# Patient Record
Sex: MALE | Race: OTHER | Employment: FULL TIME | URBAN - METROPOLITAN AREA
[De-identification: names, ages, dates, MRNs, and addresses within clinical notes are randomized per-mention and may not be internally consistent; named-entity substitution may affect disease eponyms.]

---

## 2020-09-01 RX ORDER — OMEPRAZOLE 20 MG/1
40 CAPSULE, DELAYED RELEASE ORAL DAILY
COMMUNITY

## 2020-09-01 SDOH — HEALTH STABILITY: MENTAL HEALTH: HOW OFTEN DO YOU HAVE A DRINK CONTAINING ALCOHOL?: NEVER

## 2020-09-02 ENCOUNTER — OFFICE VISIT (OUTPATIENT)
Dept: ENDOCRINOLOGY | Age: 40
End: 2020-09-02
Payer: COMMERCIAL

## 2020-09-02 VITALS
SYSTOLIC BLOOD PRESSURE: 103 MMHG | HEART RATE: 64 BPM | HEIGHT: 66 IN | BODY MASS INDEX: 38.31 KG/M2 | DIASTOLIC BLOOD PRESSURE: 75 MMHG | OXYGEN SATURATION: 97 % | TEMPERATURE: 97.5 F | WEIGHT: 238.4 LBS

## 2020-09-02 PROBLEM — E29.1 HYPOGONADISM IN MALE: Status: ACTIVE | Noted: 2020-09-02

## 2020-09-02 PROBLEM — E23.6 RATHKE'S CLEFT CYST (HCC): Status: ACTIVE | Noted: 2020-09-02

## 2020-09-02 PROBLEM — E22.1 HYPERPROLACTINEMIA (HCC): Status: ACTIVE | Noted: 2020-09-02

## 2020-09-02 PROBLEM — L40.9 PSORIASIS: Status: ACTIVE | Noted: 2020-09-02

## 2020-09-02 PROCEDURE — 99244 OFF/OP CNSLTJ NEW/EST MOD 40: CPT | Performed by: INTERNAL MEDICINE

## 2020-09-02 NOTE — PATIENT INSTRUCTIONS
Patient Education        Learning About a Prolactinoma  What is a prolactinoma? A prolactinoma is a tumor on the pituitary gland that makes too much of the hormone prolactin. This type of tumor is benign, which means it's not cancer. The pituitary (say \"oze-RBO-sg-tair-ee\") gland--at the base of the brain--makes prolactin. After a woman is pregnant, this hormone causes the breasts to make milk. But a prolactinoma also makes prolactin. This means that your body can have too much of the hormone. It's not normal for women who aren't pregnant or nursing to have a high level of prolactin. For both men and women, too much of this hormone can make the breasts produce milk. It also can cause low sex drive and infertility. What are the symptoms? You might not have any symptoms from a prolactinoma. But in some men and women, their breasts may produce milk. In women, an increase in prolactin can lower the level of estrogen. That can cause infertility, menstrual changes, and less desire to have sex. In men, it can lower the level of testosterone. That can cause erection problems and less desire to have sex. The tumor may cause a headache. And sometimes the tumor presses on the optic nerves, which are near the pituitary gland. This might cause vision problems. How is it diagnosed? A blood test will show if you have too much prolactin in your blood. Your doctor also may do an MRI test. It can show if you have the tumor and how big it is. How is it treated? Sometimes, no treatment is needed. If you have symptoms, your doctor may treat you with dopamine agonists. This medicine can shrink the tumor. It also may bring the level of prolactin back to normal. You may need to take this medicine for 2 years or more. During and after treatment, you will get routine tests to check your hormone levels. In some cases, surgery is done to remove the tumor. This may happen if you can't take the medicine or it doesn't work. Surgery also could be done if the tumor grows or causes problems like headaches or vision problems. Follow-up care is a key part of your treatment and safety. Be sure to make and go to all appointments, and call your doctor if you are having problems. It's also a good idea to know your test results and keep a list of the medicines you take. Where can you learn more? Go to https://[email or web address].[email or web address].Huaat. org and sign in to your [employer] account. Enter P110 in the [employer] box to learn more about \"Learning About a Prolactinoma. \"     If you do not have an account, please click on the \"Sign Up Now\" link. Current as of: August 22, 2019               Content Version: 12.5  © 1922-3558 Healthwise, Incorporated. Care instructions adapted under license by [facility] ([facility]). If you have questions about a medical condition or this instruction, always ask your healthcare professional. [address] any warranty or liability for your use of this information.

## 2020-09-02 NOTE — PROGRESS NOTES
Patient ID:   Vy Lagos is a 36 y.o. male      Chief Complaint:   Vy Lagos is seen for initial evaluation of elevated prolactin levels. Referred by Dr. Gene Haddad MD - Neurosurgery , [place]   Subjective:     Weight loss clinic suggested Testosterone check which was low.  to MRI brain in July 2020 showed 7 x 9x 4 mm in th epoast pituitary gland.  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   Right cerebellar lesion of 1.9 x 1.7 cms    Energy levels bad   Libido is good   Performance is good     He has children   Plans to have more kids     Denies nipple discharge, Nipple piercing     Not on antipsychotics, SSRIs, estrogen supplements, antiemetics, Ca channel blockers, methyldopa, opioids  Denies drugs (heroine)     No family h/o endocrine tumors     The following portions of the patient's history were reviewed and updated as appropriate:        Family History   Problem Relation Age of Onset    Brain Cancer Mother     Cancer Mother     Seizures Mother          Social History     Socioeconomic History    Marital status:      Spouse name: Not on file    Number of children: Not on file    Years of education: Not on file    Highest education level: Not on file   Occupational History    Not on file   Social Needs    Financial resource strain: Not on file    Food insecurity     Worry: Not on file     Inability: Not on file   [employer] needs     Medical: Not on file     Non-medical: Not on file   Tobacco Use    Smoking status: Never Smoker    Smokeless tobacco: Never Used   Substance and Sexual Activity    Alcohol use: Never     Frequency: Never    Drug use: Never    Sexual activity: Yes     Partners: Female   Lifestyle    Physical activity     Days per week: Not on file     Minutes per session: Not on file    Stress: Not on file   Relationships    Social connections     Talks on phone: Not on file     Gets together: Not on file     Attends Mu-ism service: Not on file Active member of club or organization: Not on file     Attends meetings of clubs or organizations: Not on file     Relationship status: Not on file    Intimate partner violence     Fear of current or ex partner: Not on file     Emotionally abused: Not on file     Physically abused: Not on file     Forced sexual activity: Not on file   Other Topics Concern    Not on file   Social History Narrative    Not on file         Past Medical History:   Diagnosis Date    Cerebellar lesion     GERD (gastroesophageal reflux disease)     [facility] [address] [address]     Pituitary lesion ([facility] [address]          Past Surgical History:   Procedure Laterality Date    ABDOMEN SURGERY         No Known Allergies      Current Outpatient Medications:     omeprazole (PRILOSEC) 20 MG delayed release capsule, Take 40 mg by mouth daily , Disp: , Rfl:       Review of Systems:  Constitutional: Negative for fever, chills, and unexpected weight change. HENT: Negative for congestion, ear pain, rhinorrhea,  sore throat and trouble swallowing. Eyes: Negative for photophobia, redness, itching. Respiratory: Negative for cough, shortness of breath and sputum. Cardiovascular: Negative for chest pain, palpitations and leg swelling. Gastrointestinal: Negative for nausea, vomiting, abdominal pain, diarrhea, constipation. Endocrine: Negative for cold intolerance, heat intolerance, polydipsia, polyphagia and polyuria. Genitourinary: Negative for dysuria, urgency, frequency, hematuria and flank pain. Musculoskeletal: Negative for myalgias, back pain, arthralgias and neck pain. Skin/Nail: Negative for rash, itching. Normal nails. Neurological: Negative for seizures, weakness, light-headedness, numbness and headaches. Hematological/ Lymph nodes: Negative for adenopathy. Does not bruise/bleed easily. Psychiatric/Behavioral: Negative for suicidal ideas, depression, anxiety, sleep disturbance and decreased concentration.           Objective: Metabolic Panel; Future  -     Prolactin; Future  -     Follicle Stimulating Hormone; Future  -     Luteinizing Hormone; Future  -     Insulin-Like Growth Factor; Future  -     Testosterone,  w SHBG Adult Male; Future  -     Miscellaneous Sendout 1; Future  -     TSH without Reflex; Future  -     T4, Free; Future  -     T3, Free; Future    Rathke's cleft cyst ([facility] [address]  -     Comprehensive Metabolic Panel; Future  -     Prolactin; Future  -     Follicle Stimulating Hormone; Future  -     Luteinizing Hormone; Future  -     Insulin-Like Growth Factor; Future  -     Testosterone,  w SHBG Adult Male; Future  -     Miscellaneous Sendout 1; Future  -     TSH without Reflex; Future  -     T4, Free; Future  -     T3, Free; Future    Psoriasis  -     Zeenat Hartley MD, Rheumatology, [facility]        1 Pituitary lesion with hypogonadism and hyperprolactinemia   Microadenoma vs Rathke's cleft cyst   Reviewed blood work from [employer] on his cell phone , July 2020   TSH 0.54, FSH 2.1, prolactin 63.7 (H), Testosterone 49 (L)     T levels low likely due to hyperprolactinemia   Will recheck all levels   Prolactin and thyroid fix will improve T levels     Check for CCP, TSH, FT4, FT3, Prolactin, Macroprolactin, TT, FSH, LH     MRI in 3 months by Neurosurgeon     3: Morbid obesity   Follows with dietitian   Stays away from carbs/sugars     Discussed weight loss options of exercise (150 minutes per week) plus diet plans   Diet plans may include intermittent fasting, low carb diet, weight watchers, mediterranean diet or caloric restriction.    Work on comorbid conditions and improved sleep     3: Psoarisis of fingers/knees     RTC in 2 months     Electronically signed by Montse Ivy MD on 9/2/2020 at 11:45 AM

## 2020-09-03 LAB
ALBUMIN/GLOBULIN RATIO: 1.9 RATIO (ref 0.8–2.6)
ALBUMIN: 4.6 G/DL (ref 3.5–5.2)
ALP BLD-CCNC: 66 U/L (ref 23–144)
ALT SERPL-CCNC: 33 U/L (ref 0–60)
AST SERPL-CCNC: 22 U/L (ref 0–55)
BILIRUB SERPL-MCNC: 0.6 MG/DL (ref 0–1.2)
BUN BLDV-MCNC: 17 MG/DL (ref 3–29)
BUN/CREAT BLD: 21 (ref 7–25)
CALCIUM SERPL-MCNC: 9.5 MG/DL (ref 8.5–10.5)
CHLORIDE BLD-SCNC: 101 MEQ/L (ref 96–110)
CO2: 28 MEQ/L (ref 19–32)
CREAT SERPL-MCNC: 0.8 MG/DL (ref 0.5–1.4)
GFR AFRICAN AMERICAN: 130 MLS/MIN/1.73M2
GFR NON-AFRICAN AMERICAN: 112 MLS/MIN/1.73M2
GLOBULIN: 2.4 G/DL (ref 1.9–3.6)
GLUCOSE BLD-MCNC: 97 MG/DL (ref 70–99)
POTASSIUM SERPL-SCNC: 4.7 MEQ/L (ref 3.4–5.3)
SODIUM BLD-SCNC: 136 MEQ/L (ref 135–148)
STATUS: NORMAL
T4 FREE: 1.22 NG/DL (ref 0.8–1.8)
TOTAL PROTEIN: 7 G/DL (ref 6–8.3)
TSH SERPL DL<=0.05 MIU/L-ACNC: 0.63 MCIU/ML (ref 0.4–4.5)

## 2020-09-04 LAB
FOLLICLE STIMULATING HORMONE: 1 MCIU/ML (ref 1.4–18.1)
LUTEINIZING HORMONE: 0.8 MIU/ML (ref 1.5–9.3)
PROLACTIN: 102 NG/ML (ref 2–18)
T3 FREE: 3.1 PG/ML (ref 2.3–4.2)

## 2020-09-06 LAB — SEX HORMONE BINDING GLOBULIN: 28 NMOL/L (ref 10–50)

## 2020-09-14 ENCOUNTER — TELEPHONE (OUTPATIENT)
Dept: ENDOCRINOLOGY | Age: 40
End: 2020-09-14

## 2020-09-14 NOTE — TELEPHONE ENCOUNTER
PT asked if we received his lab results.  He completed his lab work at [employer] on 9/3/20

## 2020-09-18 RX ORDER — CABERGOLINE 0.5 MG/1
0.25 TABLET ORAL
Qty: 8 TABLET | Refills: 1 | Status: SHIPPED | OUTPATIENT
Start: 2020-09-21 | End: 2020-10-16 | Stop reason: SDUPTHER

## 2020-10-15 ENCOUNTER — TELEPHONE (OUTPATIENT)
Dept: ENDOCRINOLOGY | Age: 40
End: 2020-10-15

## 2020-10-15 RX ORDER — CABERGOLINE 0.5 MG/1
0.25 TABLET ORAL
Qty: 8 TABLET | Refills: 1 | OUTPATIENT
Start: 2020-10-15 | End: 2021-10-15

## 2020-10-15 NOTE — TELEPHONE ENCOUNTER
Ivory Marcus Miracle advised to  refill. Mr. Marcus Rausch then stated he has been taking one whole tablet twice weekly which is why he ran out. Explained per Dr. Iggy Verdugo to just start taking the half tablet twice weekly and will refill at [facility].

## 2020-10-16 RX ORDER — CABERGOLINE 0.5 MG/1
0.25 TABLET ORAL
Qty: 8 TABLET | Refills: 0 | Status: SHIPPED | OUTPATIENT
Start: 2020-10-19 | End: 2020-11-04 | Stop reason: SDUPTHER

## 2020-10-16 NOTE — TELEPHONE ENCOUNTER
PT called back states he need a PA for the cabergoline 1 tab or the Provider can write him a script for 2 Tablets and send to pharmacy

## 2020-10-28 PROBLEM — E78.5 HYPERLIPIDEMIA: Status: ACTIVE | Noted: 2018-10-15

## 2020-11-03 ENCOUNTER — OFFICE VISIT (OUTPATIENT)
Dept: ENDOCRINOLOGY | Age: 40
End: 2020-11-03
Payer: COMMERCIAL

## 2020-11-03 VITALS
OXYGEN SATURATION: 96 % | BODY MASS INDEX: 37.83 KG/M2 | HEART RATE: 80 BPM | WEIGHT: 235.4 LBS | DIASTOLIC BLOOD PRESSURE: 78 MMHG | TEMPERATURE: 97.1 F | HEIGHT: 66 IN | SYSTOLIC BLOOD PRESSURE: 119 MMHG

## 2020-11-03 DIAGNOSIS — E22.1 HYPERPROLACTINEMIA (HCC): ICD-10-CM

## 2020-11-03 DIAGNOSIS — E23.6 RATHKE'S CLEFT CYST (HCC): ICD-10-CM

## 2020-11-03 DIAGNOSIS — E23.7 PITUITARY LESION (HCC): ICD-10-CM

## 2020-11-03 DIAGNOSIS — E29.1 HYPOGONADISM IN MALE: ICD-10-CM

## 2020-11-03 LAB
A/G RATIO: 2.1 (ref 1.1–2.2)
ALBUMIN SERPL-MCNC: 4.8 G/DL (ref 3.4–5)
ALP BLD-CCNC: 75 U/L (ref 40–129)
ALT SERPL-CCNC: 37 U/L (ref 10–40)
ANION GAP SERPL CALCULATED.3IONS-SCNC: 14 MMOL/L (ref 3–16)
AST SERPL-CCNC: 28 U/L (ref 15–37)
BILIRUB SERPL-MCNC: 0.7 MG/DL (ref 0–1)
BUN BLDV-MCNC: 18 MG/DL (ref 7–20)
CALCIUM SERPL-MCNC: 9.3 MG/DL (ref 8.3–10.6)
CHLORIDE BLD-SCNC: 102 MMOL/L (ref 99–110)
CO2: 24 MMOL/L (ref 21–32)
CREAT SERPL-MCNC: 0.7 MG/DL (ref 0.9–1.3)
GFR AFRICAN AMERICAN: >60
GFR NON-AFRICAN AMERICAN: >60
GLOBULIN: 2.3 G/DL
GLUCOSE BLD-MCNC: 85 MG/DL (ref 70–99)
POTASSIUM SERPL-SCNC: 4 MMOL/L (ref 3.5–5.1)
PROLACTIN: 2.9 NG/ML
SODIUM BLD-SCNC: 140 MMOL/L (ref 136–145)
TOTAL PROTEIN: 7.1 G/DL (ref 6.4–8.2)

## 2020-11-03 PROCEDURE — G8417 CALC BMI ABV UP PARAM F/U: HCPCS | Performed by: INTERNAL MEDICINE

## 2020-11-03 PROCEDURE — G8484 FLU IMMUNIZE NO ADMIN: HCPCS | Performed by: INTERNAL MEDICINE

## 2020-11-03 PROCEDURE — 99214 OFFICE O/P EST MOD 30 MIN: CPT | Performed by: INTERNAL MEDICINE

## 2020-11-03 PROCEDURE — G8427 DOCREV CUR MEDS BY ELIG CLIN: HCPCS | Performed by: INTERNAL MEDICINE

## 2020-11-03 PROCEDURE — 1036F TOBACCO NON-USER: CPT | Performed by: INTERNAL MEDICINE

## 2020-11-03 NOTE — PROGRESS NOTES
Patient ID:   Antonio Langford is a 36 y.o. male      Chief Complaint:   Antonio Langford is seen for an evaluation of elevated prolactin levels. Referred by Dr. Monika Clement MD - Neurosurgery , [place]   Subjective:     Weight loss clinic suggested Testosterone check which was low.  to MRI brain in July 2020 showed 7 x 9x 4 mm in th epoast pituitary gland.  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   Right cerebellar lesion of 1.9 x 1.7 cms    Cabergoline 0.25 mg twice a week     Energy levels are better since on cabergoline   Appetite is low   Libido is better    Performance is better   He has morning erections     He has children   Plans to have more kids     Denies nipple discharge, nipple piercing     Not on antipsychotics, SSRIs, estrogen supplements, antiemetics, Ca channel blockers, methyldopa, opioids  Denies drugs (heroine)     No family h/o endocrine tumors     The following portions of the patient's history were reviewed and updated as appropriate:        Family History   Problem Relation Age of Onset    Brain Cancer Mother     Cancer Mother     Seizures Mother          Social History     Socioeconomic History    Marital status:      Spouse name: Not on file    Number of children: Not on file    Years of education: Not on file    Highest education level: Not on file   Occupational History    Not on file   Social Needs    Financial resource strain: Not on file    Food insecurity     Worry: Not on file     Inability: Not on file   [employer] needs     Medical: Not on file     Non-medical: Not on file   Tobacco Use    Smoking status: Never Smoker    Smokeless tobacco: Never Used   Substance and Sexual Activity    Alcohol use: Never     Frequency: Never    Drug use: Never    Sexual activity: Yes     Partners: Female   Lifestyle    Physical activity     Days per week: Not on file     Minutes per session: Not on file    Stress: Not on file   Relationships    Social connections Talks on phone: Not on file     Gets together: Not on file     Attends Church service: Not on file     Active member of club or organization: Not on file     Attends meetings of clubs or organizations: Not on file     Relationship status: Not on file    Intimate partner violence     Fear of current or ex partner: Not on file     Emotionally abused: Not on file     Physically abused: Not on file     Forced sexual activity: Not on file   Other Topics Concern    Not on file   Social History Narrative    Not on file         Past Medical History:   Diagnosis Date    Cerebellar lesion     GERD (gastroesophageal reflux disease)     Pit[facility] ([facility] [address]     Pituitary lesion ([facility] [address]          Past Surgical History:   Procedure Laterality Date    ABDOMEN SURGERY         No Known Allergies      Current Outpatient Medications:     cabergoline (DOSTINEX) 0.5 MG tablet, Take 0.5 tablets by mouth Twice a Week, Disp: 8 tablet, Rfl: 0    omeprazole (PRILOSEC) 20 MG delayed release capsule, Take 40 mg by mouth daily , Disp: , Rfl:       Review of Systems:  Constitutional: Negative for fever, chills, and unexpected weight change. HENT: Negative for congestion, ear pain, rhinorrhea,  sore throat and trouble swallowing. Eyes: Negative for photophobia, redness, itching. Respiratory: Negative for cough, shortness of breath and sputum. Cardiovascular: Negative for chest pain, palpitations and leg swelling. Gastrointestinal: Negative for nausea, vomiting, abdominal pain, diarrhea, constipation. Endocrine: Negative for cold intolerance, heat intolerance, polydipsia, polyphagia and polyuria. Genitourinary: Negative for dysuria, urgency, frequency, hematuria and flank pain. Musculoskeletal: Negative for myalgias, back pain, arthralgias and neck pain. Skin/Nail: Negative for rash, itching. Normal nails. Neurological: Negative for seizures, weakness, light-headedness, numbness and headaches.    Hematological/ Lymph nodes: Negative for adenopathy. Does not bruise/bleed easily. Psychiatric/Behavioral: Negative for suicidal ideas, depression, anxiety, sleep disturbance and decreased concentration. Objective:   Physical Exam:  /78 (Site: Right Upper Arm, Position: Sitting, Cuff Size: Large Adult)   Pulse 80   Temp 97.1 °F (36.2 °C) (Temporal)   Ht 5' 6\" (1.676 m)   Wt 235 lb 6.4 oz (106.8 kg)   SpO2 96%   BMI 37.99 kg/m²   Constitutional: Patient is oriented to person, place, and time. Patient appears well-developed and well-nourished. HENT:    Head: Normocephalic and atraumatic. Eyes: Conjunctivae and EOM are normal.    Neck: Normal range of motion. Thyroid normal.   Cardiovascular: Normal rate, regular rhythm and normal heart sounds. Pulmonary/Chest: Effort normal and breath sounds normal.   Abdominal: Soft. Bowel sounds are normal.   Musculoskeletal: Normal range of motion. Neurological: Patient is alert and oriented to person, place, and time. Patient has slow reflexes. Skin: Skin is warm and dry. Psoriasis of fingers and knees. Psychiatric: Patient has a normal mood and affect.  Patient behavior is normal.     Lab Review:    Office Visit on 09/02/2020   Component Date Value Ref Range Status    T4 Free 09/03/2020 1.22  0.80 - 1.80 NG/DL Final    Sodium 09/03/2020 136  135 - 148 MEQ/L Final    Potassium 09/03/2020 4.7  3.4 - 5.3 MEQ/L Final    Chloride 09/03/2020 101  96 - 110 MEQ/L Final    CO2 09/03/2020 28  19 - 32 MEQ/L Final    Glucose 09/03/2020 97  70 - 99 MG/DL Final    BUN 09/03/2020 17  3 - 29 MG/DL Final    CREATININE 09/03/2020 0.8  0.5 - 1.4 MG/DL Final    Bun/Cre Ratio 09/03/2020 21  7 - 25 Final    GFR Non- 09/03/2020 112  >60 MLS/MIN/1.73M2 Final    GFR African American 09/03/2020 130  >60 MLS/MIN/1.73M2 Final    Calcium 09/03/2020 9.5  8.5 - 10.5 MG/DL Final    Total Protein 09/03/2020 7.0  6.0 - 8.3 G/DL Final    Albumin 09/03/2020 4.6  3.5 - 5.2 G/DL Final    Globulin 09/03/2020 2.4  1.9 - 3.6 G/DL Final    Albumin/Globulin Ratio 09/03/2020 1.9  0.8 - 2.6 RATIO Final    Total Bilirubin 09/03/2020 0.6  0.0 - 1.2 MG/DL Final    AST 09/03/2020 22  0 - 55 U/L Final    ALT 09/03/2020 33  0 - 60 U/L Final    Alkaline Phosphatase 09/03/2020 66  23 - 144 U/L Final    Status 09/03/2020 FASTING   Final    TSH 09/03/2020 0.628  0.400 - 4.500 MCIU/ML Final           Assessment and Plan     Frankey Rud was seen today for follow-up. Diagnoses and all orders for this visit:    Hyperprolactinemia ([address]  -     Prolactin; Future  -     Comprehensive Metabolic Panel; Future    Pituitary lesion (HCC)  -     Prolactin; Future  -     Comprehensive Metabolic Panel; Future    Hypogonadism in male  -     Prolactin; Future  -     Comprehensive Metabolic Panel; Future    Rathke's cleft cyst ([facility] [address]  -     Prolactin; Future  -     Comprehensive Metabolic Panel; Future        1 Pituitary lesion with hypogonadism and hyperprolactinemia   Microadenoma vs Rathke's cleft cyst   Reviewed blood work from [employer] on his cell phone , July 2020   TSH 0.54, FSH 2.1, prolactin 63.7 (H), Testosterone 49 (L)     Secondary work was normal in Sep 2020 (including TFT's)     Prolactin was 102 before starting cabergoline in Sep 2020     Improved in sexual function , will check T levels after normalizing prolactin     Meanwhile continue Cabergoline 0.25 mg twice a week   I may adjust the dose after the labs     MRI in 3 months by Neurosurgeon - Dec 2020. To looks pituitary and cerebellar. 3: Morbid obesity   Follows with dietitian   Stays away from carbs/sugars     Discussed weight loss options of exercise (150 minutes per week) plus diet plans   Diet plans may include intermittent fasting, low carb diet, weight watchers, mediterranean diet or caloric restriction.    Work on comorbid conditions and improved sleep     3: Psoarisis of fingers/knees     Prolactin right now   Will check TT when prolactin has normalized     Will make next visit in am so he can fast and will do fasting T levels     RTC in 2 months     Electronically signed by Lizbeth Phillip MD on 11/3/2020 at 11:15 AM

## 2020-11-04 RX ORDER — CABERGOLINE 0.5 MG/1
0.25 TABLET ORAL
Qty: 8 TABLET | Refills: 3 | Status: SHIPPED | OUTPATIENT
Start: 2020-11-05 | End: 2021-02-22 | Stop reason: SDUPTHER

## 2021-02-03 ENCOUNTER — OFFICE VISIT (OUTPATIENT)
Dept: ENDOCRINOLOGY | Age: 41
End: 2021-02-03
Payer: COMMERCIAL

## 2021-02-03 VITALS
WEIGHT: 232.4 LBS | HEIGHT: 67 IN | SYSTOLIC BLOOD PRESSURE: 110 MMHG | OXYGEN SATURATION: 96 % | TEMPERATURE: 97.3 F | DIASTOLIC BLOOD PRESSURE: 65 MMHG | HEART RATE: 66 BPM | BODY MASS INDEX: 36.47 KG/M2

## 2021-02-03 DIAGNOSIS — E23.7 PITUITARY LESION (HCC): ICD-10-CM

## 2021-02-03 DIAGNOSIS — E22.1 HYPERPROLACTINEMIA (HCC): ICD-10-CM

## 2021-02-03 DIAGNOSIS — E29.1 HYPOGONADISM IN MALE: ICD-10-CM

## 2021-02-03 DIAGNOSIS — E22.1 HYPERPROLACTINEMIA (HCC): Primary | ICD-10-CM

## 2021-02-03 DIAGNOSIS — G93.9 CEREBELLAR LESION: ICD-10-CM

## 2021-02-03 PROBLEM — J02.9 ACUTE PHARYNGITIS: Status: ACTIVE | Noted: 2021-02-03

## 2021-02-03 LAB
A/G RATIO: 1.8 (ref 1.1–2.2)
ALBUMIN SERPL-MCNC: 4.5 G/DL (ref 3.4–5)
ALP BLD-CCNC: 78 U/L (ref 40–129)
ALT SERPL-CCNC: 27 U/L (ref 10–40)
ANION GAP SERPL CALCULATED.3IONS-SCNC: 13 MMOL/L (ref 3–16)
AST SERPL-CCNC: 18 U/L (ref 15–37)
BILIRUB SERPL-MCNC: 0.8 MG/DL (ref 0–1)
BUN BLDV-MCNC: 16 MG/DL (ref 7–20)
CALCIUM SERPL-MCNC: 9.3 MG/DL (ref 8.3–10.6)
CHLORIDE BLD-SCNC: 103 MMOL/L (ref 99–110)
CO2: 26 MMOL/L (ref 21–32)
CREAT SERPL-MCNC: 0.7 MG/DL (ref 0.9–1.3)
GFR AFRICAN AMERICAN: >60
GFR NON-AFRICAN AMERICAN: >60
GLOBULIN: 2.5 G/DL
GLUCOSE BLD-MCNC: 88 MG/DL (ref 70–99)
POTASSIUM SERPL-SCNC: 4.5 MMOL/L (ref 3.5–5.1)
PROLACTIN: 2 NG/ML
SODIUM BLD-SCNC: 142 MMOL/L (ref 136–145)
TOTAL PROTEIN: 7 G/DL (ref 6.4–8.2)

## 2021-02-03 PROCEDURE — G8427 DOCREV CUR MEDS BY ELIG CLIN: HCPCS | Performed by: INTERNAL MEDICINE

## 2021-02-03 PROCEDURE — G8417 CALC BMI ABV UP PARAM F/U: HCPCS | Performed by: INTERNAL MEDICINE

## 2021-02-03 PROCEDURE — 99214 OFFICE O/P EST MOD 30 MIN: CPT | Performed by: INTERNAL MEDICINE

## 2021-02-03 PROCEDURE — G8484 FLU IMMUNIZE NO ADMIN: HCPCS | Performed by: INTERNAL MEDICINE

## 2021-02-03 PROCEDURE — 1036F TOBACCO NON-USER: CPT | Performed by: INTERNAL MEDICINE

## 2021-02-03 NOTE — PROGRESS NOTES
Patient ID:   Sonya Rivera is a 36 y.o. male    Chief Complaint:   Sonya Rivera is seen for an evaluation of elevated prolactin levels. Referred by Dr. Jailene Gomez MD - Neurosurgery , [place]   Subjective:     Weight loss clinic suggested Testosterone check which was low.  to MRI brain in July 2020 showed 7 x 9x 4 mm in th epoast pituitary gland.  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   Right cerebellar lesion of 1.9 x 1.7 cms    Cabergoline 0.25 mg twice a week     Energy levels are better since on cabergoline   Appetite is low   Libido is very good    Performance is good    He has morning erections     He has children   Plans to have more kids     Denies nipple discharge     Not on antipsychotics, SSRIs, estrogen supplements, antiemetics, Ca channel blockers, methyldopa, opioids  Denies drugs (heroine)     No family h/o endocrine tumors     The following portions of the patient's history were reviewed and updated as appropriate:        Family History   Problem Relation Age of Onset    Brain Cancer Mother     Cancer Mother     Seizures Mother          Social History     Socioeconomic History    Marital status:      Spouse name: Not on file    Number of children: Not on file    Years of education: Not on file    Highest education level: Not on file   Occupational History    Not on file   Social Needs    Financial resource strain: Not on file    Food insecurity     Worry: Not on file     Inability: Not on file   [employer] needs     Medical: Not on file     Non-medical: Not on file   Tobacco Use    Smoking status: Never Smoker    Smokeless tobacco: Never Used   Substance and Sexual Activity    Alcohol use: Never     Frequency: Never    Drug use: Never    Sexual activity: Yes     Partners: Female   Lifestyle    Physical activity     Days per week: Not on file     Minutes per session: Not on file    Stress: Not on file   Relationships    Social connections     Talks on phone: Not on file     Gets together: Not on file     Attends Sikhism service: Not on file     Active member of club or organization: Not on file     Attends meetings of clubs or organizations: Not on file     Relationship status: Not on file    Intimate partner violence     Fear of current or ex partner: Not on file     Emotionally abused: Not on file     Physically abused: Not on file     Forced sexual activity: Not on file   Other Topics Concern    Not on file   Social History Narrative    Not on file         Past Medical History:   Diagnosis Date    Cerebellar lesion     GERD (gastroesophageal reflux disease)     Pit[facility] lesion ([facility] [address]     Pituitary lesion ([facility] [address]          Past Surgical History:   Procedure Laterality Date    ABDOMEN SURGERY         No Known Allergies      Current Outpatient Medications:     cabergoline (DOSTINEX) 0.5 MG tablet, Take 0.5 tablets by mouth Twice a Week, Disp: 8 tablet, Rfl: 3    omeprazole (PRILOSEC) 20 MG delayed release capsule, Take 40 mg by mouth daily , Disp: , Rfl:       Review of Systems:  Constitutional: Negative for fever, chills, and unexpected weight change. HENT: Negative for congestion, ear pain, rhinorrhea,  sore throat and trouble swallowing. Eyes: Negative for photophobia, redness, itching. Respiratory: Negative for cough, shortness of breath and sputum. Cardiovascular: Negative for chest pain, palpitations and leg swelling. Gastrointestinal: Negative for nausea, vomiting, abdominal pain, diarrhea, constipation. Endocrine: Negative for cold intolerance, heat intolerance, polydipsia, polyphagia and polyuria. Genitourinary: Negative for dysuria, urgency, frequency, hematuria and flank pain. Musculoskeletal: Negative for myalgias, back pain, arthralgias and neck pain. Skin/Nail: Negative for rash, itching. Normal nails. Neurological: Negative for seizures, weakness, light-headedness, numbness and headaches.    Hematological/ Lymph nodes: Negative for adenopathy. Does not bruise/bleed easily. Psychiatric/Behavioral: Negative for suicidal ideas, depression, anxiety, sleep disturbance and decreased concentration. Objective:   Physical Exam:  /65 (Site: Right Upper Arm, Position: Sitting, Cuff Size: Large Adult)   Pulse 66   Temp 97.3 °F (36.3 °C) (Temporal)   Ht 5' 7\" (1.702 m)   Wt 232 lb 6.4 oz (105.4 kg)   SpO2 96%   BMI 36.40 kg/m²   Constitutional: Patient is oriented to person, place, and time. Patient appears well-developed and well-nourished. HENT:    Head: Normocephalic and atraumatic. Eyes: Conjunctivae and EOM are normal.    Neck: Normal range of motion. Thyroid normal.   Cardiovascular: Normal rate, regular rhythm and normal heart sounds. Pulmonary/Chest: Effort normal and breath sounds normal.   Abdominal: Soft. Bowel sounds are normal.   Musculoskeletal: Normal range of motion. Neurological: Patient is alert and oriented to person, place, and time. Patient has slow reflexes. Skin: Skin is warm and dry. Psoriasis of fingers and knees. Psychiatric: Patient has a normal mood and affect.  Patient behavior is normal.     Lab Review:    Orders Only on 11/03/2020   Component Date Value Ref Range Status    Sodium 11/03/2020 140  136 - 145 mmol/L Final    Potassium 11/03/2020 4.0  3.5 - 5.1 mmol/L Final    Chloride 11/03/2020 102  99 - 110 mmol/L Final    CO2 11/03/2020 24  21 - 32 mmol/L Final    Anion Gap 11/03/2020 14  3 - 16 Final    Glucose 11/03/2020 85  70 - 99 mg/dL Final    BUN 11/03/2020 18  7 - 20 mg/dL Final    CREATININE 11/03/2020 0.7* 0.9 - 1.3 mg/dL Final    GFR Non- 11/03/2020 >60  >60 Final    GFR  11/03/2020 >60  >60 Final    Calcium 11/03/2020 9.3  8.3 - 10.6 mg/dL Final    Total Protein 11/03/2020 7.1  6.4 - 8.2 g/dL Final    Albumin 11/03/2020 4.8  3.4 - 5.0 g/dL Final    Albumin/Globulin Ratio 11/03/2020 2.1  1.1 - 2.2 Final    Total Bilirubin 11/03/2020 0.7  0.0 - 1.0 mg/dL Final    Alkaline Phosphatase 11/03/2020 75  40 - 129 U/L Final    ALT 11/03/2020 37  10 - 40 U/L Final    AST 11/03/2020 28  15 - 37 U/L Final    Globulin 11/03/2020 2.3  g/dL Final    Prolactin 11/03/2020 2.9  ng/mL Final   Office Visit on 09/02/2020   Component Date Value Ref Range Status    T4 Free 09/03/2020 1.22  0.80 - 1.80 NG/DL Final    Sodium 09/03/2020 136  135 - 148 MEQ/L Final    Potassium 09/03/2020 4.7  3.4 - 5.3 MEQ/L Final    Chloride 09/03/2020 101  96 - 110 MEQ/L Final    CO2 09/03/2020 28  19 - 32 MEQ/L Final    Glucose 09/03/2020 97  70 - 99 MG/DL Final    BUN 09/03/2020 17  3 - 29 MG/DL Final    CREATININE 09/03/2020 0.8  0.5 - 1.4 MG/DL Final    Bun/Cre Ratio 09/03/2020 21  7 - 25 Final    GFR Non- 09/03/2020 112  >60 MLS/MIN/1.73M2 Final    GFR African American 09/03/2020 130  >60 MLS/MIN/1.73M2 Final    Calcium 09/03/2020 9.5  8.5 - 10.5 MG/DL Final    Total Protein 09/03/2020 7.0  6.0 - 8.3 G/DL Final    Albumin 09/03/2020 4.6  3.5 - 5.2 G/DL Final    Globulin 09/03/2020 2.4  1.9 - 3.6 G/DL Final    Albumin/Globulin Ratio 09/03/2020 1.9  0.8 - 2.6 RATIO Final    Total Bilirubin 09/03/2020 0.6  0.0 - 1.2 MG/DL Final    AST 09/03/2020 22  0 - 55 U/L Final    ALT 09/03/2020 33  0 - 60 U/L Final    Alkaline Phosphatase 09/03/2020 66  23 - 144 U/L Final    Status 09/03/2020 FASTING   Final    TSH 09/03/2020 0.628  0.400 - 4.500 MCIU/ML Final    Sex Hormone Binding 09/03/2020 28  10 - 50 nmol/L Final    FSH 09/03/2020 1.0* 1.4 - 18.1 MCIU/ML Final    LH 09/03/2020 0.8* 1.5 - 9.3 MIU/ML Final    Prolactin 09/03/2020 102* 2 - 18 NG/ML Final    T3, Free 09/03/2020 3.1  2.3 - 4.2 PG/ML Final           Assessment and Plan     Abimael Solorzano was seen today for follow-up. Diagnoses and all orders for this visit:    Hyperprolactinemia ([facility] [address]  -     Comprehensive Metabolic Panel; Future  -     Prolactin;  Future  - Testosterone, free, total; Future    Pituitary lesion ([facility] [address]  -     Comprehensive Metabolic Panel; Future  -     Prolactin; Future  -     Testosterone, free, total; Future    Hypogonadism in male  -     Comprehensive Metabolic Panel; Future  -     Prolactin; Future  -     Testosterone, free, total; Future    Cerebellar lesion  -     Comprehensive Metabolic Panel; Future  -     Prolactin; Future  -     Testosterone, free, total; Future        1 Pituitary lesion with hypogonadism and hyperprolactinemia   Microadenoma vs Rathke's cleft cyst   Reviewed blood work from [facility] on his cell phone , July 2020   TSH 0.54, FSH 2.1, prolactin 63.7 (H), Testosterone 49 (L)     Secondary work was normal in Sep 2020 (including TFT's)     Prolactin was 102 before starting cabergoline in Sep 2020     On Cabergoline 0.25 mg twice a week- Prolactin improved to 2.9 in Nov 2020     Improved in sexual function , will check T levels after normalizing prolactin     Meanwhile continue Cabergoline 0.25 mg twice a week   I may adjust the dose after the labs     MRI Jan 2021: Report reviewed from [facility]. \"Pituitary: Again noted is the heterogeneous lesion in the inferior aspect of the pituitary gland. This is less well defined compared to the prior. There has been no interval increase in size. Superior pituitary tissue remains convex upward near the midline. There is no deviation of the pituitary stalk.  Optic chiasm is normal.  Stable ill-defined area of high T2 and FLAIR signal in the right cerebellar hemisphere with a small amount of associated low T1 signal and dark gradient echo signal.\"     He has to schedule appointment with Neurosurgeon. (for pituitary and cerebellar)     If no MRI done by Neurosurgery than I will repeat MRI in Jan 2022    3: Morbid obesity   Follows with dietitian   Stays away from carbs/sugars     Discussed weight loss options of exercise (150 minutes per week) plus diet plans   Diet plans may include

## 2021-02-05 LAB
SEX HORMONE BINDING GLOBULIN: 22 NMOL/L (ref 11–80)
TESTOSTERONE FREE-NONMALE: 108.3 PG/ML (ref 47–244)
TESTOSTERONE TOTAL: 427 NG/DL (ref 220–1000)

## 2021-02-22 DIAGNOSIS — E22.1 HYPERPROLACTINEMIA (HCC): ICD-10-CM

## 2021-02-22 RX ORDER — CABERGOLINE 0.5 MG/1
0.25 TABLET ORAL
Qty: 8 TABLET | Refills: 3 | Status: SHIPPED | OUTPATIENT
Start: 2021-02-22 | End: 2021-08-03 | Stop reason: SDUPTHER

## 2021-08-03 ENCOUNTER — OFFICE VISIT (OUTPATIENT)
Dept: ENDOCRINOLOGY | Age: 41
End: 2021-08-03
Payer: COMMERCIAL

## 2021-08-03 VITALS
HEART RATE: 72 BPM | BODY MASS INDEX: 35.47 KG/M2 | WEIGHT: 226 LBS | DIASTOLIC BLOOD PRESSURE: 70 MMHG | SYSTOLIC BLOOD PRESSURE: 118 MMHG | OXYGEN SATURATION: 98 % | HEIGHT: 67 IN

## 2021-08-03 DIAGNOSIS — E22.1 HYPERPROLACTINEMIA (HCC): ICD-10-CM

## 2021-08-03 DIAGNOSIS — E23.7 PITUITARY LESION (HCC): ICD-10-CM

## 2021-08-03 DIAGNOSIS — E22.1 HYPERPROLACTINEMIA (HCC): Primary | ICD-10-CM

## 2021-08-03 DIAGNOSIS — E29.1 HYPOGONADISM IN MALE: ICD-10-CM

## 2021-08-03 LAB
A/G RATIO: 2 (ref 1.1–2.2)
ALBUMIN SERPL-MCNC: 4.7 G/DL (ref 3.4–5)
ALP BLD-CCNC: 74 U/L (ref 40–129)
ALT SERPL-CCNC: 25 U/L (ref 10–40)
ANION GAP SERPL CALCULATED.3IONS-SCNC: 9 MMOL/L (ref 3–16)
AST SERPL-CCNC: 19 U/L (ref 15–37)
BILIRUB SERPL-MCNC: 0.8 MG/DL (ref 0–1)
BUN BLDV-MCNC: 20 MG/DL (ref 7–20)
CALCIUM SERPL-MCNC: 9.2 MG/DL (ref 8.3–10.6)
CHLORIDE BLD-SCNC: 103 MMOL/L (ref 99–110)
CO2: 27 MMOL/L (ref 21–32)
CREAT SERPL-MCNC: 0.8 MG/DL (ref 0.9–1.3)
GFR AFRICAN AMERICAN: >60
GFR NON-AFRICAN AMERICAN: >60
GLOBULIN: 2.3 G/DL
GLUCOSE BLD-MCNC: 85 MG/DL (ref 70–99)
POTASSIUM SERPL-SCNC: 4.5 MMOL/L (ref 3.5–5.1)
PROLACTIN: 1.5 NG/ML
SODIUM BLD-SCNC: 139 MMOL/L (ref 136–145)
TOTAL PROTEIN: 7 G/DL (ref 6.4–8.2)

## 2021-08-03 PROCEDURE — G8427 DOCREV CUR MEDS BY ELIG CLIN: HCPCS | Performed by: INTERNAL MEDICINE

## 2021-08-03 PROCEDURE — 99214 OFFICE O/P EST MOD 30 MIN: CPT | Performed by: INTERNAL MEDICINE

## 2021-08-03 PROCEDURE — 4004F PT TOBACCO SCREEN RCVD TLK: CPT | Performed by: INTERNAL MEDICINE

## 2021-08-03 PROCEDURE — G8417 CALC BMI ABV UP PARAM F/U: HCPCS | Performed by: INTERNAL MEDICINE

## 2021-08-03 RX ORDER — CABERGOLINE 0.5 MG/1
0.5 TABLET ORAL
Qty: 8 TABLET | Refills: 5 | Status: SHIPPED | OUTPATIENT
Start: 2021-08-05 | End: 2022-02-04 | Stop reason: SDUPTHER

## 2021-08-03 RX ORDER — CABERGOLINE 0.5 MG/1
0.25 TABLET ORAL
Qty: 8 TABLET | Refills: 3 | Status: SHIPPED | OUTPATIENT
Start: 2021-08-05 | End: 2021-08-03 | Stop reason: SDUPTHER

## 2021-08-03 NOTE — PROGRESS NOTES
Patient ID:   Joey Mcmanus is a 36 y.o. male    Chief Complaint:   Joey Mcmanus is seen for an evaluation of elevated prolactin levels. Referred by Dr. Von Prince MD - Neurosurgery , [place]   Subjective:     Weight loss clinic suggested Testosterone check which was low.  to MRI brain in July 2020 showed 7 x 9x 4 mm in th epoast pituitary gland.  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   Right cerebellar lesion of 1.9 x 1.7 cms    Cabergoline 0.25 mg twice a week     Energy levels are better since on cabergoline   Appetite is good   Libido is very good    Performance is good    He has morning erections     He has children   Plans to have more kids     Denies nipple discharge     Not on antipsychotics, SSRIs, estrogen supplements, antiemetics, Ca channel blockers, methyldopa, opioids  Denies drugs (heroine)     No family h/o endocrine tumors     The following portions of the patient's history were reviewed and updated as appropriate:        Family History   Problem Relation Age of Onset    Brain Cancer Mother     Cancer Mother     Seizures Mother     Heart Attack Father          Social History     Socioeconomic History    Marital status:      Spouse name: Not on file    Number of children: Not on file    Years of education: Not on file    Highest education level: Not on file   Occupational History    Not on file   Tobacco Use    Smoking status: Current Every Day Smoker     Types: Cigarettes    Smokeless tobacco: Never Used   Vaping Use    Vaping Use: Never used   Substance and Sexual Activity    Alcohol use: Not Currently    Drug use: Never    Sexual activity: Yes     Partners: Female   Other Topics Concern    Not on file   Social History Narrative    Not on file     Social Determinants of Health     Financial Resource Strain:     Difficulty of Paying Living Expenses:    Food Insecurity:     Worried About Running Out of Food in the Last Year:     Ran Out of Food in the Last Year:    Transportation Needs:     Lack of Transportation (Medical):  Lack of Transportation (Non-Medical):    Physical Activity:     Days of Exercise per Week:     Minutes of Exercise per Session:    Stress:     Feeling of Stress :    Social Connections:     Frequency of Communication with Friends and Family:     Frequency of Social Gatherings with Friends and Family:     Attends Adventist Services:     Active Member of Clubs or Organizations:     Attends Club or Organization Meetings:     Marital Status:    Intimate Partner Violence:     Fear of Current or Ex-Partner:     Emotionally Abused:     Physically Abused:     Sexually Abused:          Past Medical History:   Diagnosis Date    Cerebellar lesion     GERD (gastroesophageal reflux disease)     Pit[facility] ([facility] [address]     [facility]          Past Surgical History:   Procedure Laterality Date    ABDOMEN SURGERY      OTHER SURGICAL HISTORY      biopsy back of the head       No Known Allergies      Current Outpatient Medications:     [START ON 8/5/2021] cabergoline (DOSTINEX) 0.5 MG tablet, Take 1 tablet by mouth Twice a Week, Disp: 8 tablet, Rfl: 5    omeprazole (PRILOSEC) 20 MG delayed release capsule, Take 40 mg by mouth daily , Disp: , Rfl:       Review of Systems:  Constitutional: Negative for fever, chills, and unexpected weight change. HENT: Negative for congestion, ear pain, rhinorrhea,  sore throat and trouble swallowing. Eyes: Negative for photophobia, redness, itching. Respiratory: Negative for cough, shortness of breath and sputum. Cardiovascular: Negative for chest pain, palpitations and leg swelling. Gastrointestinal: Negative for nausea, vomiting, abdominal pain, diarrhea, constipation. Endocrine: Negative for cold intolerance, heat intolerance, polydipsia, polyphagia and polyuria. Genitourinary: Negative for dysuria, urgency, frequency, hematuria and flank pain.    Musculoskeletal: Negative for myalgias, back pain, arthralgias and neck pain. Skin/Nail: Negative for rash, itching. Normal nails. Neurological: Negative for seizures, weakness, light-headedness, numbness and headaches. Hematological/ Lymph nodes: Negative for adenopathy. Does not bruise/bleed easily. Psychiatric/Behavioral: Negative for suicidal ideas, depression, anxiety, sleep disturbance and decreased concentration. Objective:   Physical Exam:  /70 (Site: Left Upper Arm, Position: Sitting, Cuff Size: Large Adult)   Pulse 72   Ht 5' 7\" (1.702 m)   Wt 226 lb (102.5 kg)   SpO2 98%   BMI 35.40 kg/m²   Constitutional: Patient is oriented to person, place, and time. Patient appears well-developed and well-nourished. HENT:    Head: Normocephalic and atraumatic. Eyes: Conjunctivae and EOM are normal.    Neck: Normal range of motion. Thyroid normal.   Cardiovascular: Normal rate, regular rhythm and normal heart sounds. Pulmonary/Chest: Effort normal and breath sounds normal.   Abdominal: Soft. Bowel sounds are normal.   Musculoskeletal: Normal range of motion. Neurological: Patient is alert and oriented to person, place, and time. Patient has slow reflexes. Skin: Skin is warm and dry. Psoriasis of fingers and knees. Psychiatric: Patient has a normal mood and affect. Patient behavior is normal.     Lab Review:    Orders Only on 02/03/2021   Component Date Value Ref Range Status    Testosterone 02/03/2021 427  220 - 1,000 ng/dL Final    Sex Hormone Binding 02/03/2021 22  11 - 80 nmol/L Final    Testosterone, Free 02/03/2021 108. 3  47 - 244 pg/mL Final    Prolactin 02/03/2021 2.0  ng/mL Final    Sodium 02/03/2021 142  136 - 145 mmol/L Final    Potassium 02/03/2021 4.5  3.5 - 5.1 mmol/L Final    Chloride 02/03/2021 103  99 - 110 mmol/L Final    CO2 02/03/2021 26  21 - 32 mmol/L Final    Anion Gap 02/03/2021 13  3 - 16 Final    Glucose 02/03/2021 88  70 - 99 mg/dL Final    BUN 02/03/2021 16  7 - 20 mg/dL Final    CREATININE 02/03/2021 0.7* 0.9 - 1.3 mg/dL Final    GFR Non- 02/03/2021 >60  >60 Final    GFR  02/03/2021 >60  >60 Final    Calcium 02/03/2021 9.3  8.3 - 10.6 mg/dL Final    Total Protein 02/03/2021 7.0  6.4 - 8.2 g/dL Final    Albumin 02/03/2021 4.5  3.4 - 5.0 g/dL Final    Albumin/Globulin Ratio 02/03/2021 1.8  1.1 - 2.2 Final    Total Bilirubin 02/03/2021 0.8  0.0 - 1.0 mg/dL Final    Alkaline Phosphatase 02/03/2021 78  40 - 129 U/L Final    ALT 02/03/2021 27  10 - 40 U/L Final    AST 02/03/2021 18  15 - 37 U/L Final    Globulin 02/03/2021 2.5  g/dL Final   Orders Only on 11/03/2020   Component Date Value Ref Range Status    Sodium 11/03/2020 140  136 - 145 mmol/L Final    Potassium 11/03/2020 4.0  3.5 - 5.1 mmol/L Final    Chloride 11/03/2020 102  99 - 110 mmol/L Final    CO2 11/03/2020 24  21 - 32 mmol/L Final    Anion Gap 11/03/2020 14  3 - 16 Final    Glucose 11/03/2020 85  70 - 99 mg/dL Final    BUN 11/03/2020 18  7 - 20 mg/dL Final    CREATININE 11/03/2020 0.7* 0.9 - 1.3 mg/dL Final    GFR Non- 11/03/2020 >60  >60 Final    GFR  11/03/2020 >60  >60 Final    Calcium 11/03/2020 9.3  8.3 - 10.6 mg/dL Final    Total Protein 11/03/2020 7.1  6.4 - 8.2 g/dL Final    Albumin 11/03/2020 4.8  3.4 - 5.0 g/dL Final    Albumin/Globulin Ratio 11/03/2020 2.1  1.1 - 2.2 Final    Total Bilirubin 11/03/2020 0.7  0.0 - 1.0 mg/dL Final    Alkaline Phosphatase 11/03/2020 75  40 - 129 U/L Final    ALT 11/03/2020 37  10 - 40 U/L Final    AST 11/03/2020 28  15 - 37 U/L Final    Globulin 11/03/2020 2.3  g/dL Final    Prolactin 11/03/2020 2.9  ng/mL Final   Office Visit on 09/02/2020   Component Date Value Ref Range Status    T4 Free 09/03/2020 1.22  0.80 - 1.80 NG/DL Final    Sodium 09/03/2020 136  135 - 148 MEQ/L Final    Potassium 09/03/2020 4.7  3.4 - 5.3 MEQ/L Final    Chloride 09/03/2020 101  96 - 110 MEQ/L Final    CO2 09/03/2020 28  19 - 32 MEQ/L Final    Glucose 09/03/2020 97  70 - 99 MG/DL Final    BUN 09/03/2020 17  3 - 29 MG/DL Final    CREATININE 09/03/2020 0.8  0.5 - 1.4 MG/DL Final    Bun/Cre Ratio 09/03/2020 21  7 - 25 Final    GFR Non- 09/03/2020 112  >60 MLS/MIN/1.73M2 Final    GFR African American 09/03/2020 130  >60 MLS/MIN/1.73M2 Final    Calcium 09/03/2020 9.5  8.5 - 10.5 MG/DL Final    Total Protein 09/03/2020 7.0  6.0 - 8.3 G/DL Final    Albumin 09/03/2020 4.6  3.5 - 5.2 G/DL Final    Globulin 09/03/2020 2.4  1.9 - 3.6 G/DL Final    Albumin/Globulin Ratio 09/03/2020 1.9  0.8 - 2.6 RATIO Final    Total Bilirubin 09/03/2020 0.6  0.0 - 1.2 MG/DL Final    AST 09/03/2020 22  0 - 55 U/L Final    ALT 09/03/2020 33  0 - 60 U/L Final    Alkaline Phosphatase 09/03/2020 66  23 - 144 U/L Final    Status 09/03/2020 FASTING   Final    TSH 09/03/2020 0.628  0.400 - 4.500 MCIU/ML Final    Sex Hormone Binding 09/03/2020 28  10 - 50 nmol/L Final    FSH 09/03/2020 1.0* 1.4 - 18.1 MCIU/ML Final    LH 09/03/2020 0.8* 1.5 - 9.3 MIU/ML Final    Prolactin 09/03/2020 102* 2 - 18 NG/ML Final    T3, Free 09/03/2020 3.1  2.3 - 4.2 PG/ML Final           Assessment and Plan     Katina Laura was seen today for follow-up. Diagnoses and all orders for this visit:    Hyperprolactinemia ([facility] [address]  -     Discontinue: cabergoline (DOSTINEX) 0.5 MG tablet; Take 0.5 tablets by mouth Twice a Week  -     Comprehensive Metabolic Panel; Future  -     Prolactin; Future  -     Testosterone, free, total; Future  -     cabergoline (DOSTINEX) 0.5 MG tablet; Take 1 tablet by mouth Twice a Week    Pituitary lesion ([facility])  -     Discontinue: cabergoline (DOSTINEX) 0.5 MG tablet; Take 0.5 tablets by mouth Twice a Week  -     cabergoline (DOSTINEX) 0.5 MG tablet; Take 1 tablet by mouth Twice a Week    Hypogonadism in male  -     Discontinue: cabergoline (DOSTINEX) 0.5 MG tablet;  Take 0.5 tablets by mouth Twice a Week  - cabergoline (DOSTINEX) 0.5 MG tablet; Take 1 tablet by mouth Twice a Week        1 Pituitary lesion with hypogonadism and hyperprolactinemia   Microadenoma vs Rathke's cleft cyst   Reviewed blood work from [id number] on his cell phone , July 2020   TSH 0.54, FSH 2.1, prolactin 63.7 (H), Testosterone 49 (L)     Secondary work was normal in Sep 2020 (including TFT's)     Prolactin was 102 before starting cabergoline in Sep 2020   Prolactin improved to 2.9 in Nov 2020   Prolactin 2.0 - Feb 2021     On Cabergoline 0.25 mg twice a week-     Improved in sexual function , T levels good in Jan 2021      Meanwhile continue Cabergoline 0.25 mg twice a week     MRI Jan 2021: Report reviewed from [facility]. \"Pituitary: Again noted is the heterogeneous lesion in the inferior aspect of the pituitary gland. This is less well defined compared to the prior. There has been no interval increase in size. Superior pituitary tissue remains convex upward near the midline. There is no deviation of the pituitary stalk. Optic chiasm is normal.  Stable ill-defined area of high T2 and FLAIR signal in the right cerebellar hemisphere with a small amount of associated low T1 signal and dark gradient echo signal.\"     Cerebellar biopsy benign in April 2021 . Follows with Neurosurgeon. (for pituitary and cerebellar)     He is doing MRI tomorrow at [facility]. 3: Morbid obesity   Follows with dietitian   Stays away from carbs/sugars     Discussed weight loss options of exercise (150 minutes per week) plus diet plans   Diet plans may include intermittent fasting, low carb diet, weight watchers, mediterranean diet or caloric restriction.    Work on comorbid conditions and improved sleep     3: Psoarisis of fingers/knees   Improving       Prolactin, CMP and TT today      Will make next visit in am so he can fast and will do fasting T levels     RTC in 6 months     Electronically signed by Damien Kay MD on 8/3/2021 at 8:33 AM

## 2021-08-04 LAB
SEX HORMONE BINDING GLOBULIN: 32 NMOL/L (ref 11–80)
TESTOSTERONE FREE-NONMALE: 103.2 PG/ML (ref 47–244)
TESTOSTERONE TOTAL: 485 NG/DL (ref 220–1000)

## 2021-12-07 ENCOUNTER — OFFICE VISIT (OUTPATIENT)
Dept: ORTHOPEDIC SURGERY | Age: 41
End: 2021-12-07
Payer: COMMERCIAL

## 2021-12-07 VITALS — HEIGHT: 67 IN | WEIGHT: 226 LBS | BODY MASS INDEX: 35.47 KG/M2

## 2021-12-07 DIAGNOSIS — M25.859 FEMORAL ACETABULAR IMPINGEMENT: Primary | ICD-10-CM

## 2021-12-07 DIAGNOSIS — M25.551 RIGHT HIP PAIN: ICD-10-CM

## 2021-12-07 PROCEDURE — 99204 OFFICE O/P NEW MOD 45 MIN: CPT | Performed by: ORTHOPAEDIC SURGERY

## 2021-12-07 PROCEDURE — G8417 CALC BMI ABV UP PARAM F/U: HCPCS | Performed by: ORTHOPAEDIC SURGERY

## 2021-12-07 PROCEDURE — 4004F PT TOBACCO SCREEN RCVD TLK: CPT | Performed by: ORTHOPAEDIC SURGERY

## 2021-12-07 PROCEDURE — G8484 FLU IMMUNIZE NO ADMIN: HCPCS | Performed by: ORTHOPAEDIC SURGERY

## 2021-12-07 PROCEDURE — G8427 DOCREV CUR MEDS BY ELIG CLIN: HCPCS | Performed by: ORTHOPAEDIC SURGERY

## 2021-12-07 NOTE — LETTER
Physical Therapy Rehabilitation Referral    Patient Name: León Washburn MRN: <[id number]>  DOS: 12/7/2021     Diagnosis:   1. Femoral acetabular impingement    2.  Right hip pain            Precautions:     [x] Evaluate and Treat    Post Op Instructions:  [] Continuous passive motion (CPM)   [] AAROM: Flexion to 90   [] Uni-planar passive range of motion   [] AAROM: External rotation to 10   [] Hip brace on at all times    [] AAROM: Extension to 10   [] Hip brace when hip at risk     [] AAROM:  Neutral IR   [] Home exercise program (copy to patient)   [] AAROM: Abduction to 25    [] Isometric external rotator strengthening    [] Isometric glute max strengthening     [] Isometric abductor strengthening              Post-op Phases:  []Phase I: Maximum Protection (Day 1-3 Weeks)  []Phase II: Mobility & Neuromuscular Retraining (3-6 Weeks)  []Phase III: Phase III: Muscle Balance and Strengthening (6-12 Weeks)  []Phase IV: Functional Training of the Hip & Lower Extremity (12-18 Weeks)  []Phase V: Advanced Training  Specificity for Return to Sport and/or Work (18-24 [employer])    Non-Operative & Pre-Operative Rehabilitation:    Cardiovascular:            Deep Hip Rotators  [x] Upright Bike (Baseline)           [x] External Rotators AROM in 4PK (Baseline)  [x] Walking (Progression 1)           [x] Internal Rotators AROM in 4PK (Baseline)  [] Running (Progression 2)           [x] ER in side lying (Progression 1)  [] Dynamic Loading (Progression 3)          [x] IR in side lying (Progression 1)                [] ER with resistance in 4PK (Progression 2)                [] IR with resistance in 4PK (Progression 2)                 [] Lateral Step Up (Progression 3)  Positioning:  [x] 4PK with pelvic tilts (Baseline)  [x] Pelvic tilts in sitting (Progression 1)      Core:   [x] Relaxation Breathing (Baseline)         [x] [place] lying elbow presses (Progression 1)  [x] Side Planks (Baseline)         [x] Side planks + hip abduction

## 2021-12-07 NOTE — PROGRESS NOTES
Date:  2021    Name:  Harlow Canavan  Address:  [facility] [address] [address]    :  1980      Age:   39 y.o.    SSN:  [id number]      Medical Record Number:  [id number]    Reason for Visit:    Chief Complaint    New Patient (NP RIGHT HIP PAIN X 4 MONTHS)      DOS:2021     HPI: Harlow Canavan is a 39 y.o. male here today for right anterior hip pain and mechanical symptoms for the past 5 months. He lives in [place]. He denies any specific injury or any childhood history of hip diseases. He owns multiple cell phone stores. He lives with his family and has 3 kids. He has been noting anterior hip pain specifically with inward rotation or lifting up while standing. He feels like something wants to pop. He had seen a chiropractor a few months ago since who had adjusted his right hip and this led to some temporary relief of symptoms. He has been taking some ibuprofen which has been somewhat helpful and he has in fact taken it for a left hand injury. He denies any low back pain. No numbness or tingling. Pain Assessment  Location of Pain:  (HIP)  Location Modifiers: Right  Severity of Pain: 7  Quality of Pain:  (PRESSURE)  Frequency of Pain: Constant  Aggravating Factors: Bending (LIFTING OF RIGHT LEG)  Limiting Behavior: Yes  Result of Injury: No  Work-Related Injury: No  Are there other pain locations you wish to document?: No  ROS: Review of systems reviewed from Patient History Form completed today and available in the patient's chart under the Media tab.        Past Medical History:   Diagnosis Date    Cerebellar lesion     GERD (gastroesophageal reflux disease)     Pituitary lesion (HCC)     Pituitary lesion (HCC)         Past Surgical History:   Procedure Laterality Date    ABDOMEN SURGERY      OTHER SURGICAL HISTORY      biopsy back of the head       Family History   Problem Relation Age of Onset    Brain Cancer Mother     Cancer Mother     Seizures Mother  Heart Attack Father        Social History     Socioeconomic History    Marital status:      Spouse name: None    Number of children: None    Years of education: None    Highest education level: None   Occupational History    None   Tobacco Use    Smoking status: Current Every Day Smoker     Types: Cigarettes    Smokeless tobacco: Never Used   Vaping Use    Vaping Use: Never used   Substance and Sexual Activity    Alcohol use: Not Currently    Drug use: Never    Sexual activity: Yes     Partners: Female   Other Topics Concern    None   Social History Narrative    None     Social Determinants of Health     Financial Resource Strain:     Difficulty of Paying Living Expenses: Not on file   Food Insecurity:     Worried About Running Out of Food in the Last Year: Not on file    Jerrod of Food in the Last Year: Not on file   Transportation Needs:     Lack of Transportation (Medical): Not on file    Lack of Transportation (Non-Medical):  Not on file   Physical Activity:     Days of Exercise per Week: Not on file    Minutes of Exercise per Session: Not on file   Stress:     Feeling of Stress : Not on file   Social Connections:     Frequency of Communication with Friends and Family: Not on file    Frequency of Social Gatherings with Friends and Family: Not on file    Attends Quaker Services: Not on file    Active Member of [address] [place] [address] or Organizations: Not on file    Attends Club or Organization Meetings: Not on file    Marital Status: Not on file   Intimate Partner Violence:     Fear of Current or Ex-Partner: Not on file    Emotionally Abused: Not on file    Physically Abused: Not on file    Sexually Abused: Not on file   Housing Stability:     Unable to Pay for Housing in the Last Year: Not on file    Number of [place] in the Last Year: Not on file    Unstable Housing in the Last Year: Not on file       Current Outpatient Medications   Medication Sig Dispense Refill    cabergoline (DOSTINEX) 0.5 MG tablet Take 1 tablet by mouth Twice a Week 8 tablet 5    omeprazole (PRILOSEC) 20 MG delayed release capsule Take 40 mg by mouth daily        No current facility-administered medications for this visit. No Known Allergies    Vital signs:  Ht 5' 7\" (1.702 m)   Wt 226 lb (102.5 kg)   BMI 35.40 kg/m²        Constitutional: The physical examination finds the patient to be well-developed and well-nourished. The patient is alert and oriented x3 and was cooperative throughout the visit. Neuro: no focal deficits noted. Normal mood, judgement, decision making  Eyes: sclera clear, EOMI  Ears: Normal external ear  Mouth:  No perioral lesions  Pulm: Respirations unlabored and regular  Pulse: Extremities well perfused, warm, capillary refill < 2 seconds  Musculoskeletal:    Hip Examination: RIGHT    Skin/Inspection: no skin lesions, cellulitis, or extreme edema in the lower extremities. Standing/Walking: Normal non-antalgic gait, no pelvic tilt, -ve Trendelenburg sign. No use of assistive devices    Sitting Exam: 5/5 Hip Flexor Strength, 5/5 Abductor Strength, 5/5 Adductor strength, Negative Straight Leg Raise    Supine Exam: Non tender around the ASIS, AIIS  Flexion arc 0 to 100deg, with + pain, +stinchfield  Internal Rotation 15deg   External Rotation 30deg  Special Tests: +Deep Flexion Test, +FADIR , ++TOBY,     Side Lying Exam: non tender at greater trochanter, non tender at abductor musculature, non  tender along the TFL, non  tender along short external rotators/piriformis. Abductor side leg raise 4/5 strength. +OberTest    Prone Exam: + hip flexion contracture, internal rotation to 25 deg, external rotation to 45 deg. Non tender at the ischial tuberosity/proximal hamstring    Distal Neurovascular exam is intact (foot sensation, pulses, and motor exam)      Diagnostics:  Radiology:       Pertinent imaging reviewed, images only - no report available.     Radiographs were obtained and reviewed in the office; 3 views: AP Pelvis and right hip 45-deg Atkinson View, and right False Profile View    Tonnis ndGndrndanddndend:nd nd2nd LCEA: 38-40  Alpha Angle: 60deg with notable bone outgrowth at head-neck junction  Cross over-sign is focal  Other: Negative Coxa Profunda, Negative Protrusio    Impression: significant head/neck asphericity anterior neck. combined CAM/PINCER APOLINAR, no acute findings. Assessment: Patient is a 39 y.o. male right hip pain for 5 months consistent with an xray diagnosis of cam impingement most likely causing labral irritation and mechanical symptoms. Impression:  Visit Diagnoses       Codes    Femoral acetabular impingement    -  Primary M25.859    Right hip pain     M25.551          Office Procedures:  No orders of the defined types were placed in this encounter. Orders Placed This Encounter   Procedures    XR HIP RIGHT (2-3 VIEWS)     Standing Status:   Future     Number of Occurrences:   1     Standing Expiration Date:   12/7/2022    Ambulatory referral to Physical Therapy     Referral Priority:   Routine     Referral Type:   Eval and Treat     Referral Reason:   Specialty Services Required     Number of Visits Requested:   1       Plan:  Pertinent imaging was reviewed. The etiology, natural history, and treatment options for the disorder were discussed. The roles of activity medication, antiinflammatories, injections, bracing, physical therapy, and surgical interventions were all described to the patient and questions were answered. We believe Georgina Vigil is a candidate for formal supervised physical therapy for hip mobility, strengthening, pelvic conditioning program.  Referral was sent via [employer] for 4 sessions once weekly from baseline to progression 2 exercises. Future treatment options include cortisone injection and/or further work-up with an MRI. All the patient's questions were answered while in the clinic.   The patient is understanding of all instructions and agrees with the plan. Approximately 45 minutes was spent on patient education and coordinating care. At that point we can progress to progression 3 if he is tolerating it well. Follow up in: Return in about 4 weeks (around 1/4/2022). Hip Self assessment forms      Sincerely,    Terence Armstrong MD [facility]   [address], [address] [place] [address], [facility] E Katja Morley  Email: [email or web address]  Office: [phone]    12/07/21  5:09 PM    This dictation was performed with a verbal recognition program (DRAGON) and it was checked for errors. It is possible that there are still dictated errors within this office note. If so, please bring any errors to my attention for an addendum. All efforts were made to ensure that this office note is accurate.

## 2021-12-13 ENCOUNTER — HOSPITAL ENCOUNTER (OUTPATIENT)
Dept: PHYSICAL THERAPY | Age: 41
Setting detail: THERAPIES SERIES
Discharge: HOME | End: 2021-12-13
Payer: COMMERCIAL

## 2021-12-13 PROCEDURE — 97140 MANUAL THERAPY 1/> REGIONS: CPT

## 2021-12-13 PROCEDURE — 97161 PT EVAL LOW COMPLEX 20 MIN: CPT

## 2021-12-13 NOTE — FLOWSHEET NOTE
[employer]    Physical Therapy Treatment Note/ Progress Report:     Date:  2021    Patient Name:  Alexsander Jones    :  1980  MRN: [id number]  Medical/Treatment Diagnosis Information:  · Diagnosis: R hip pain M25.551  · Treatment Diagnosis: R hip pain  Insurance/Certification information:  PT Insurance Information: [address] [place] [address]  Physician Information:  Referring Practitioner: Lala Stewart MD  Plan of care signed (Y/N):     Date of Patient follow up with Physician:      Progress Report: []  Yes  [x]  No     Functional Scale:   21 iHOT12 = 83% disability    Date Range for reporting period:  Beginnin21  Ending:      Progress report due (10 Rx/or 30 days whichever is less): 3/37/98     Recertification due (POC duration/ or 90 days whichever is less): 22     Visit # Insurance Allowable Auth Needed   1 Per Tri Prey [x]Yes    []No     Pain level:  7/10 seated hip flexion     SUBJECTIVE:  See eval    OBJECTIVE: See eval   Observation:    Test measurements:      RESTRICTIONS/PRECAUTIONS: *seated hip flexion, squatting  Current adductor/abd ratio is 0.79    Exercises/Interventions:     Therapeutic Ex Resistance Sets/sec Reps Notes   Lateral banded self mobilization HEP 5 min     SL hip adduction  3 12    SL bridge  5 5                                                                     Therapeutic Activities                                                               Manual Intervention 15'       Hip mobs, AP  7 min  No change   Hip mobs, lateral  8 min  Dec pain                               NMR re-education                                                                          Therapeutic Exercise and NMR EXR  [x] (07704) Provided verbal/tactile cueing for activities related to strengthening, flexibility, endurance, ROM for improvements in LE, proximal hip, and core control with self care, mobility, lifting, ambulation.   [] (87087) Provided verbal/tactile cueing for activities related to improving balance, coordination, kinesthetic sense, posture, motor skill, proprioception  to assist with LE, proximal hip, and core control in self care, mobility, lifting, ambulation and eccentric single leg control. NMR and Therapeutic Activities:    [x] (90633 or 84335) Provided verbal/tactile cueing for activities related to improving balance, coordination, kinesthetic sense, posture, motor skill, proprioception and motor activation to allow for proper function of core, proximal hip and LE with self care and ADLs  [] (16649) Gait Re-education- Provided training and instruction to the patient for proper LE, core and proximal hip recruitment and positioning and eccentric body weight control with ambulation re-education including up and down stairs     Home Exercise Program:    [x] (96366) Reviewed/Progressed HEP activities related to strengthening, flexibility, endurance, ROM of core, proximal hip and LE for functional self-care, mobility, lifting and ambulation/stair navigation   [] (59786)Reviewed/Progressed HEP activities related to improving balance, coordination, kinesthetic sense, posture, motor skill, proprioception of core, proximal hip and LE for self care, mobility, lifting, and ambulation/stair navigation      Manual Treatments:  PROM / STM / Oscillations-Mobs:  G-I, II, III, IV (PA's, Inf., Post.)  [x] (03890) Provided manual therapy to mobilize LE, proximal hip and/or LS spine soft tissue/joints for the purpose of modulating pain, promoting relaxation,  increasing ROM, reducing/eliminating soft tissue swelling/inflammation/restriction, improving soft tissue extensibility and allowing for proper ROM for normal function with self care, mobility, lifting and ambulation.      Modalities:      Charges:  Timed Code Treatment Minutes: eval +20   Total Treatment Minutes: 50       [x] EVAL (LOW) 32887 (typically 20 minutes face-to-face)  [] EVAL (MOD) 43542 (typically 30 minutes face-to-face)  [] EVAL (HIGH) 98047 (typically 45 minutes face-to-face)  [] RE-EVAL     [] NL(05250) x     [] IONTO (39639)  [] NMR (25563) x     [] VASO (19355)  [x] Manual (62423) x1     [] Other:  [] TA (02605)x     [] Mech Traction (28800)  [] ES(attended) (94701)     [] ES (un) (44667):     GOALS: *  Patient stated goal: decrease pain  []? Progressing: []? Met: []? Not Met: []? Adjusted     Therapist goals for Patient:   Short Term Goals: To be achieved in: 2 weeks  1. Independent in HEP and progression per patient tolerance, in order to prevent re-injury. []? Progressing: []? Met: []? Not Met: []? Adjusted  2. Patient will have a decrease in pain to facilitate improvement in movement, function, and ADLs as indicated by Functional Deficits. []? Progressing: []? Met: []? Not Met: []? Adjusted     Long Term Goals: To be achieved in: 6-8 weeks  1. Disability index score of 10% or less for the LEFS to assist with reaching prior level of function. []? Progressing: []? Met: []? Not Met: []? Adjusted  2. Patient will demonstrate increased AROM to [facility] to allow for proper joint functioning as indicated by patients Functional Deficits. []? Progressing: []? Met: []? Not Met: []? Adjusted  3. Patient will demonstrate an increase in Strength to good proximal hip strength and control, within 5lb HHD in LE to allow for proper functional mobility as indicated by patients Functional Deficits. []? Progressing: []? Met: []? Not Met: []? Adjusted  4. Patient will return to squatting functional activities without increased symptoms or restriction. []? Progressing: []? Met: []? Not Met: []? Adjusted  5. Pt will tolerate lifting leg to dress/bathe without increased symptoms   []? Progressing: []? Met: []? Not Met: []? Adjusted         Progression Towards Functional goals:  [] Patient is progressing as expected towards functional goals listed.     [] Progression is slowed due to

## 2021-12-13 NOTE — PLAN OF CARE
[place] [address]  Phone [phone]   Fax [phone]                                                       Physical Therapy Certification    Dear Referring Practitioner: Collins Tyson MD,    We had the pleasure of evaluating the following patient for physical therapy services at [address] [place] [address]. A summary of our findings can be found in the initial assessment below. This includes our plan of care. If you have any questions or concerns regarding these findings, please do not hesitate to contact me at the office phone number checked above.   Thank you for the referral.       Physician Signature:_______________________________Date:__________________  By signing above (or electronic signature), therapists plan is approved by physician      Patient: Najma Hammond   : 1980   MRN: [id number]  Referring Physician: Referring Practitioner: Collins Tyson MD      Evaluation Date: 2021      Medical Diagnosis Information:  Diagnosis: R hip pain M25.551   Treatment Diagnosis: R hip pain                                         Insurance information: PT Insurance Information: [employer]     Precautions/ Contra-indications:     C-SSRS Triggered by Intake questionnaire (Past 2 wk assessment):   [x] No, Questionnaire did not trigger screening.   [] Yes, Patient intake triggered further evaluation      [] C-SSRS Screening completed  [] PCP notified via Plan of Care  [] Emergency services notified     Latex Allergy:  [x]NO      []YES  Preferred Language for Healthcare:   [x]English       []other:    SUBJECTIVE: Patient stated complaint: see body chart    Relevant Medical History: none  Functional Disability Index: iHOT-12 = 83% disability    Pain Scale: see body chart  Easing factors: see body chart  Provocative factors: see body chart     Type: []Constant   []Intermittent  []Radiating []Localized []other:     Numbness/Tingling: see body chart    Occupation/School:see body chart     Living Status/Prior Level of Function: Independent with ADLs and IADLs, see body chart    OBJECTIVE:     ROM LEFT RIGHT   HIP Flex 100 90*   HIP Abd 55 45   HIP Ext     HIP IR 30 20*   HIP ER 40 30*   Knee ext     Knee Flex     Ankle PF     Ankle DF     Ankle In     Ankle Ev     Strength  LEFT RIGHT   HIP Flexors     HIP Abductors 28.6 28.6   Hip adduction 13.1 19.4   HIP Ext 36.9 28.4   Hip ER 30.3 24.7   Hip IR 23.8 24.3        Ankle DF     Ankle PF     Ankle Inv     Ankle EV          Circumference  Mid apex  7 cm prox                 Reflexes/Sensation:    [x]Dermatomes/Myotomes intact    [x]Reflexes equal and normal bilaterally   []Other:    Joint mobility: lumbar spine clear   []Normal    []Hypo   []Hyper    Palpation: no significant muscular tenderness    Functional Mobility/Transfers: squats in crouch position, minimal pain    Posture: unremarkable    Bandages/Dressings/Incisions: n/a    Gait: (include devices/WB status) unremarkable    Orthopedic Special Tests: FADIR and TOBY not tested as PROM in pure planes were provocative                       [x] Patient history, allergies, meds reviewed. Medical chart reviewed. See intake form. Review Of Systems (ROS):  [x]Performed Review of systems (Integumentary, CardioPulmonary, Neurological) by intake and observation. Intake form has been scanned into medical record. Patient has been instructed to contact their primary care physician regarding ROS issues if not already being addressed at this time.       Co-morbidities/Complexities (which will affect course of rehabilitation):   [x]None           Arthritic conditions   []Rheumatoid arthritis (M05.9)  []Osteoarthritis (M19.91)   Cardiovascular conditions   []Hypertension (I10)  []Hyperlipidemia (E78.5)  []Angina pectoris (I20)  []Atherosclerosis (I70) Musculoskeletal conditions   []Disc pathology   []Congenital spine pathologies   []Prior surgical intervention  []Osteoporosis (M81.8)  []Osteopenia (M85.8)   Endocrine conditions   []Hypothyroid (E03.9)  []Hyperthyroid Gastrointestinal conditions   []Constipation (D58.58)   Metabolic conditions   []Morbid obesity (E66.01)  []Diabetes type 1(E10.65) or 2 (E11.65)   []Neuropathy (G60.9)     Pulmonary conditions   []Asthma (J45)  []Coughing   []COPD (J44.9)   Psychological Disorders  []Anxiety (F41.9)  []Depression (F32.9)   []Other:   []Other:          Barriers to/and or personal factors that will affect rehab potential:              []Age  []Sex              []Motivation/Lack of Motivation                        []Co-Morbidities              []Cognitive Function, education/learning barriers              []Environmental, home barriers              []profession/work barriers  []past PT/medical experience  []other:  Justification: no significant barriers    Falls Risk Assessment (30 days):   [x] Falls Risk assessed and no intervention required. [] Falls Risk assessed and Patient requires intervention due to being higher risk   TUG score (>12s at risk):     [] Falls education provided, including         ASSESSMENT: s/s consistent with R hip APOLINAR.   Functional Impairments:     []Noted lumbar/proximal hip/LE joint hypomobility   [x]Decreased LE functional ROM   [x]Decreased core/proximal hip strength and neuromuscular control   [x]Decreased LE functional strength   []Reduced balance/proprioceptive control   []other:      Functional Activity Limitations (from functional questionnaire and intake)   [x]Reduced ability to tolerate prolonged functional positions   [x]Reduced ability or difficulty with changes of positions or transfers between positions   []Reduced ability to maintain good posture and demonstrate good body mechanics with sitting, bending, and lifting   []Reduced ability to sleep   [] Reduced ability or tolerance with driving and/or computer work   []Reduced ability to perform lifting, carrying tasks   [x]Reduced ability to squat   []Reduced ability to forward bend   []Reduced ability to ambulate prolonged functional periods/distances/surfaces   []Reduced ability to ascend/descend stairs   []Reduced ability to run, hop, cut or jump   []other:    Participation Restrictions   [x]Reduced participation in self care activities   [x]Reduced participation in home management activities   []Reduced participation in work activities   [x]Reduced participation in social activities. []Reduced participation in sport/recreation activities. Classification :    []Signs/symptoms consistent with post-surgical status including decreased ROM, strength and function.    []Signs/symptoms consistent with joint sprain/strain   []Signs/symptoms consistent with patella-femoral syndrome   []Signs/symptoms consistent with knee OA/hip OA   []Signs/symptoms consistent with internal derangement of knee/Hip   []Signs/symptoms consistent with functional hip weakness/NMR control      []Signs/symptoms consistent with tendinitis/tendinosis    []signs/symptoms consistent with pathology which may benefit from Dry needling      [x]other:  APOLINAR    Prognosis/Rehab Potential:      [x]Excellent   []Good    []Fair   []Poor    Tolerance of evaluation/treatment:    [x]Excellent   []Good    []Fair   []Poor    Physical Therapy Evaluation Complexity Justification  [x] A history of present problem with:  [x] no personal factors and/or comorbidities that impact the plan of care;  []1-2 personal factors and/or comorbidities that impact the plan of care  []3 personal factors and/or comorbidities that impact the plan of care  [x] An examination of body systems using standardized tests and measures addressing any of the following: body structures and functions (impairments), activity limitations, and/or participation restrictions;:  [x] a total of 1-2 or more elements   [] a of 10% or less for the LEFS to assist with reaching prior level of function. [] Progressing: [] Met: [] Not Met: [] Adjusted  2. Patient will demonstrate increased AROM to [facility] to allow for proper joint functioning as indicated by patients Functional Deficits. [] Progressing: [] Met: [] Not Met: [] Adjusted  3. Patient will demonstrate an increase in Strength to good proximal hip strength and control, within 5lb HHD in LE to allow for proper functional mobility as indicated by patients Functional Deficits. [] Progressing: [] Met: [] Not Met: [] Adjusted  4. Patient will return to squatting functional activities without increased symptoms or restriction. [] Progressing: [] Met: [] Not Met: [] Adjusted  5. Pt will tolerate lifting leg to dress/bathe without increased symptoms   [] Progressing: [] Met: [] Not Met: [] Adjusted     Electronically signed by:   Lucien Amador PT

## 2021-12-20 ENCOUNTER — HOSPITAL ENCOUNTER (OUTPATIENT)
Dept: PHYSICAL THERAPY | Age: 41
Setting detail: THERAPIES SERIES
Discharge: HOME | End: 2021-12-20
Payer: COMMERCIAL

## 2021-12-20 PROCEDURE — 97140 MANUAL THERAPY 1/> REGIONS: CPT

## 2021-12-20 PROCEDURE — 97110 THERAPEUTIC EXERCISES: CPT

## 2021-12-20 NOTE — FLOWSHEET NOTE
for activities related to strengthening, flexibility, endurance, ROM for improvements in LE, proximal hip, and core control with self care, mobility, lifting, ambulation.  [] (96827) Provided verbal/tactile cueing for activities related to improving balance, coordination, kinesthetic sense, posture, motor skill, proprioception  to assist with LE, proximal hip, and core control in self care, mobility, lifting, ambulation and eccentric single leg control.      NMR and Therapeutic Activities:    [x] (51414 or 92349) Provided verbal/tactile cueing for activities related to improving balance, coordination, kinesthetic sense, posture, motor skill, proprioception and motor activation to allow for proper function of core, proximal hip and LE with self care and ADLs  [] (31398) Gait Re-education- Provided training and instruction to the patient for proper LE, core and proximal hip recruitment and positioning and eccentric body weight control with ambulation re-education including up and down stairs     Home Exercise Program:    [x] (05095) Reviewed/Progressed HEP activities related to strengthening, flexibility, endurance, ROM of core, proximal hip and LE for functional self-care, mobility, lifting and ambulation/stair navigation   [] (68681)Reviewed/Progressed HEP activities related to improving balance, coordination, kinesthetic sense, posture, motor skill, proprioception of core, proximal hip and LE for self care, mobility, lifting, and ambulation/stair navigation      Manual Treatments:  PROM / STM / Oscillations-Mobs:  G-I, II, III, IV (PA's, Inf., Post.)  [x] (88823) Provided manual therapy to mobilize LE, proximal hip and/or LS spine soft tissue/joints for the purpose of modulating pain, promoting relaxation,  increasing ROM, reducing/eliminating soft tissue swelling/inflammation/restriction, improving soft tissue extensibility and allowing for proper ROM for normal function with self care, mobility, lifting and ambulation. Modalities:      Charges:  Timed Code Treatment Minutes: 38   Total Treatment Minutes: 38       [] EVAL (LOW) 03324 (typically 20 minutes face-to-face)  [] EVAL (MOD) 87615 (typically 30 minutes face-to-face)  [] EVAL (HIGH) 00187 (typically 45 minutes face-to-face)  [] RE-EVAL     [x] PB(34505) x 2    [] IONTO (15418)  [] NMR (37203) x     [] VASO (51819)  [x] Manual (51605) x1     [] Other:  [] TA (39980)x     [] Mech Traction (31209)  [] ES(attended) (43255)     [] ES (un) (73832):     GOALS: *  Patient stated goal: decrease pain  []? Progressing: []? Met: []? Not Met: []? Adjusted     Therapist goals for Patient:   Short Term Goals: To be achieved in: 2 weeks  1. Independent in HEP and progression per patient tolerance, in order to prevent re-injury. []? Progressing: []? Met: []? Not Met: []? Adjusted  2. Patient will have a decrease in pain to facilitate improvement in movement, function, and ADLs as indicated by Functional Deficits. []? Progressing: []? Met: []? Not Met: []? Adjusted     Long Term Goals: To be achieved in: 6-8 weeks  1. Disability index score of 10% or less for the LEFS to assist with reaching prior level of function. []? Progressing: []? Met: []? Not Met: []? Adjusted  2. Patient will demonstrate increased [facility] to allow for proper joint functioning as indicated by patients Functional Deficits. []? Progressing: []? Met: []? Not Met: []? Adjusted  3. Patient will demonstrate an increase in Strength to good proximal hip strength and control, within 5lb HHD in LE to allow for proper functional mobility as indicated by patients Functional Deficits. []? Progressing: []? Met: []? Not Met: []? Adjusted  4. Patient will return to squatting functional activities without increased symptoms or restriction. []? Progressing: []? Met: []? Not Met: []? Adjusted  5. Pt will tolerate lifting leg to dress/bathe without increased symptoms   []? Progressing: []? Met: []?  Not Met: []? Adjusted         Progression Towards Functional goals:  [] Patient is progressing as expected towards functional goals listed. [] Progression is slowed due to complexities listed. [] Progression has been slowed due to co-morbidities. [x] Plan just implemented, too soon to assess goals progression  [] Other:     ASSESSMENT:  Pt tolerated exercise progressions with increased fatigue with hip abduction work. Careful with hip flexion positions to avoid pain provocation. Treatment/Activity Tolerance:  [x] Patient tolerated treatment well [] Patient limited by fatique  [] Patient limited by pain  [] Patient limited by other medical complications  [] Other:     Overall Progression Towards Functional goals/ Treatment Progress Update:  [] Patient is progressing as expected towards functional goals listed. [] Progression is slowed due to complexities/Impairments listed. [] Progression has been slowed due to co-morbidities. [x] Plan just implemented, too soon to assess goals progression <30days   [] Goals require adjustment due to lack of progress  [] Patient is not progressing as expected and requires additional follow up with physician  [] Other    Prognosis for POC: [x] Good [] Fair  [] Poor    Patient requires continued skilled intervention: [x] Yes  [] No        PLAN:   [x] Continue per plan of care [] Alter current plan (see comments)  [] Plan of care initiated [] Hold pending MD visit [] Discharge    Electronically signed by: Francisco J Spencer PT    Note: If patient does not return for scheduled/recommended follow up visits, this note will serve as a discharge from care along with the most recent update on progress.

## 2021-12-28 ENCOUNTER — HOSPITAL ENCOUNTER (OUTPATIENT)
Dept: PHYSICAL THERAPY | Age: 41
Setting detail: THERAPIES SERIES
Discharge: HOME | End: 2021-12-28
Payer: COMMERCIAL

## 2021-12-28 PROCEDURE — 97110 THERAPEUTIC EXERCISES: CPT

## 2021-12-28 PROCEDURE — 97140 MANUAL THERAPY 1/> REGIONS: CPT

## 2021-12-28 NOTE — FLOWSHEET NOTE
[facility]    Physical Therapy Treatment Note/ Progress Report:     Date:  2021    Patient Name:  Tutu Palmer    :  1980  MRN: [id number]  Medical/Treatment Diagnosis Information:  · Diagnosis: R hip pain M25.551  · Treatment Diagnosis: R hip pain  Insurance/Certification information:  PT Insurance Information: [address] [place] [address]  Physician Information:  Referring Practitioner: María Steel MD  Plan of care signed (Y/N):     Date of Patient follow up with Physician:      Progress Report: []  Yes  [x]  No     Functional Scale:   21 iHOT12 = 83% disability    Date Range for reporting period:  Beginnin21  Ending:      Progress report due (10 Rx/or 30 days whichever is less):      Recertification due (POC duration/ or 90 days whichever is less): 22     Visit # Insurance Allowable Auth Needed   3 Per [employer] [x]Yes    []No     Pain level:  4-7/10 standing hip flexion     SUBJECTIVE:  Pt states that his hip is improving. Notes pain now with doing a standing march which he has to do 3-4 times per day.      OBJECTIVE:    Observation:    Test measurements:      RESTRICTIONS/PRECAUTIONS: *seated hip flexion, squatting  Current adductor/abd ratio is 0.79    Exercises/Interventions:     Therapeutic Ex  28 min Resistance Sets/sec Reps Notes   Pt education; self mobilization  5 min  Inferior mob in supine/KTC      TB sidestepping teal 3 Laps    140# 3 10     2 10 Ea direction   Slider hip adduction  2 15    Assisted reverse nordic curl black 3 10    Hip thruster 25# 1 25x ^weight npv   Kneeling hip hinge teal 3 10    Knee bent cophenhagen raise  1 10                    Therapeutic Activities                                                               Manual Intervention  10 min       Hip mobs, inferior in hip flexion  10 min  Dec pain                               NMR re-education Therapeutic Exercise and NMR EXR  [x] (98232) Provided verbal/tactile cueing for activities related to strengthening, flexibility, endurance, ROM for improvements in LE, proximal hip, and core control with self care, mobility, lifting, ambulation.  [] (68087) Provided verbal/tactile cueing for activities related to improving balance, coordination, kinesthetic sense, posture, motor skill, proprioception  to assist with LE, proximal hip, and core control in self care, mobility, lifting, ambulation and eccentric single leg control.      NMR and Therapeutic Activities:    [x] (20431 or 67531) Provided verbal/tactile cueing for activities related to improving balance, coordination, kinesthetic sense, posture, motor skill, proprioception and motor activation to allow for proper function of core, proximal hip and LE with self care and ADLs  [] (13599) Gait Re-education- Provided training and instruction to the patient for proper LE, core and proximal hip recruitment and positioning and eccentric body weight control with ambulation re-education including up and down stairs     Home Exercise Program:    [x] (71247) Reviewed/Progressed HEP activities related to strengthening, flexibility, endurance, ROM of core, proximal hip and LE for functional self-care, mobility, lifting and ambulation/stair navigation   [] (35735)Reviewed/Progressed HEP activities related to improving balance, coordination, kinesthetic sense, posture, motor skill, proprioception of core, proximal hip and LE for self care, mobility, lifting, and ambulation/stair navigation      Manual Treatments:  PROM / STM / Oscillations-Mobs:  G-I, II, III, IV (PA's, Inf., Post.)  [x] (45635) Provided manual therapy to mobilize LE, proximal hip and/or LS spine soft tissue/joints for the purpose of modulating pain, promoting relaxation,  increasing ROM, reducing/eliminating soft tissue swelling/inflammation/restriction, improving soft tissue extensibility and allowing for proper ROM for normal function with self care, mobility, lifting and ambulation. Modalities:      Charges:  Timed Code Treatment Minutes: 38   Total Treatment Minutes: 38       [] EVAL (LOW) 32036 (typically 20 minutes face-to-face)  [] EVAL (MOD) 09791 (typically 30 minutes face-to-face)  [] EVAL (HIGH) 36415 (typically 45 minutes face-to-face)  [] RE-EVAL     [x] FX(04944) x 2    [] IONTO (70769)  [] NMR (47554) x     [] VASO (03223)  [x] Manual (59322) x1     [] Other:  [] TA (02125)x     [] Mech Traction (18962)  [] ES(attended) (36755)     [] ES (un) (21256):     GOALS: *  Patient stated goal: decrease pain  []? Progressing: []? Met: []? Not Met: []? Adjusted     Therapist goals for Patient:   Short Term Goals: To be achieved in: 2 weeks  1. Independent in HEP and progression per patient tolerance, in order to prevent re-injury. []? Progressing: []? Met: []? Not Met: []? Adjusted  2. Patient will have a decrease in pain to facilitate improvement in movement, function, and ADLs as indicated by Functional Deficits. []? Progressing: []? Met: []? Not Met: []? Adjusted     Long Term Goals: To be achieved in: 6-8 weeks  1. Disability index score of 10% or less for the LEFS to assist with reaching prior level of function. []? Progressing: []? Met: []? Not Met: []? Adjusted  2. Patient will demonstrate increased AROM to [facility] to allow for proper joint functioning as indicated by patients Functional Deficits. []? Progressing: []? Met: []? Not Met: []? Adjusted  3. Patient will demonstrate an increase in Strength to good proximal hip strength and control, within 5lb HHD in LE to allow for proper functional mobility as indicated by patients Functional Deficits. []? Progressing: []? Met: []? Not Met: []? Adjusted  4. Patient will return to squatting functional activities without increased symptoms or restriction. []? Progressing: []? Met: []? Not Met: []? Adjusted  5.  Pt will tolerate lifting leg to dress/bathe without increased symptoms   []? Progressing: []? Met: []? Not Met: []? Adjusted         Progression Towards Functional goals:  [] Patient is progressing as expected towards functional goals listed. [] Progression is slowed due to complexities listed. [] Progression has been slowed due to co-morbidities. [x] Plan just implemented, too soon to assess goals progression  [] Other:     ASSESSMENT:  Pt with good response to manual therapy and was shown how to change his hip mobilizations to an inferior glide at 90 deg of hip flexion to more aggressively address his deficits. Loading progressed but with good tolerance. Challenged the most with anterior hip strength. Treatment/Activity Tolerance:  [x] Patient tolerated treatment well [] Patient limited by fatique  [] Patient limited by pain  [] Patient limited by other medical complications  [] Other:     Overall Progression Towards Functional goals/ Treatment Progress Update:  [] Patient is progressing as expected towards functional goals listed. [] Progression is slowed due to complexities/Impairments listed. [] Progression has been slowed due to co-morbidities. [x] Plan just implemented, too soon to assess goals progression <30days   [] Goals require adjustment due to lack of progress  [] Patient is not progressing as expected and requires additional follow up with physician  [] Other    Prognosis for POC: [x] Good [] Fair  [] Poor    Patient requires continued skilled intervention: [x] Yes  [] No        PLAN:   [x] Continue per plan of care [] Alter current plan (see comments)  [] Plan of care initiated [] Hold pending MD visit [] Discharge    Electronically signed by: Elmer De La Torre, PT    Note: If patient does not return for scheduled/recommended follow up visits, this note will serve as a discharge from care along with the most recent update on progress.

## 2022-01-04 ENCOUNTER — HOSPITAL ENCOUNTER (OUTPATIENT)
Dept: PHYSICAL THERAPY | Age: 42
Setting detail: THERAPIES SERIES
Discharge: HOME | End: 2022-01-04

## 2022-01-04 ENCOUNTER — OFFICE VISIT (OUTPATIENT)
Dept: ORTHOPEDIC SURGERY | Age: 42
End: 2022-01-04
Payer: COMMERCIAL

## 2022-01-04 VITALS — HEIGHT: 67 IN | BODY MASS INDEX: 35.47 KG/M2 | WEIGHT: 226 LBS | RESPIRATION RATE: 12 BRPM

## 2022-01-04 DIAGNOSIS — S73.191A TEAR OF RIGHT ACETABULAR LABRUM, INITIAL ENCOUNTER: ICD-10-CM

## 2022-01-04 DIAGNOSIS — M25.859 FEMORAL ACETABULAR IMPINGEMENT: Primary | ICD-10-CM

## 2022-01-04 PROCEDURE — G8417 CALC BMI ABV UP PARAM F/U: HCPCS | Performed by: ORTHOPAEDIC SURGERY

## 2022-01-04 PROCEDURE — G8484 FLU IMMUNIZE NO ADMIN: HCPCS | Performed by: ORTHOPAEDIC SURGERY

## 2022-01-04 PROCEDURE — 99214 OFFICE O/P EST MOD 30 MIN: CPT | Performed by: ORTHOPAEDIC SURGERY

## 2022-01-04 PROCEDURE — G8427 DOCREV CUR MEDS BY ELIG CLIN: HCPCS | Performed by: ORTHOPAEDIC SURGERY

## 2022-01-04 PROCEDURE — 4004F PT TOBACCO SCREEN RCVD TLK: CPT | Performed by: ORTHOPAEDIC SURGERY

## 2022-01-04 NOTE — FLOWSHEET NOTE
[facility] [employer]    Physical Therapy  Cancellation/No-show Note  Patient Name:  Shellie River  :  1980   Date:  2022  Cancelled visits to date: 1  No-shows to date: 0    For today's appointment patient:  [x]  Cancelled  []  Rescheduled appointment  []  No-show     Reason given by patient:  []  Patient ill  []  Conflicting appointment  []  No transportation    []  Conflict with work  []  No reason given  [x]  Other:     Comments:  MD is ordering MRI    Phone call information:   []  Phone call made today to patient at _ time at number provided:      []  Patient answered, conversation as follows:    []  Patient did not answer, message left as follows:  [x]  Phone call not made today  []  Phone call not needed - pt contacted us to cancel and provided reason for cancellation. Electronically signed by:   Marcelle Parrish PT, PT

## 2022-01-04 NOTE — PROGRESS NOTES
Chief Complaint  Hip Pain (F/u right hip pain. Patient has completed 3 sessions of PT from 12/13/21 - 12/28/21. Notes slight improvement)      History of Present Illness:  Mitzi Nava is a pleasant 39 y.o. male persistent right anterior hip pain and mechanical symptoms with deep flexion. Now ongoing for 6 months. Has done 3 sessions of physical therapy with Juanita Huddleston at her [address] [facility] office. He has had some improvement but still noticeably has pain with flexion internal rotation. Therapy is helping with mobility but his mechanical symptoms are persisting. He operates a cell phone store. He has 3 daughters, and his wife is expecting a boy in May of 2022. Medical History:  Patient's medications, allergies, past medical, surgical, social and family histories were reviewed and updated as appropriate. Pain Assessment  Location of Pain: Other (Comment) (Hip)  Location Modifiers: Right  Severity of Pain: 5  Quality of Pain: Dull,Aching,Popping  Duration of Pain: Persistent  Frequency of Pain: Intermittent  Aggravating Factors: Other (Comment) (Movement)  Limiting Behavior: Yes  Relieving Factors: Rest  Result of Injury: No  Work-Related Injury: No  Are there other pain locations you wish to document?: No  ROS: Review of systems reviewed from Patient History Form completed today and available in the patient's chart under the Media tab. Pertinent items are noted in HPI  Review of systems reviewed from Patient History Form completed today and available in the patient's chart under the Media tab. Vital Signs:  Resp 12   Ht 5' 7\" (1.702 m)   Wt 226 lb (102.5 kg)   BMI 35.40 kg/m²         Neuro: Alert & oriented x 3,  normal,  no focal deficits noted. Normal affect. Eyes: sclera clear  Ears: Normal external ear  Mouth:  No perioral lesions  Pulm: Respirations unlabored and regular  Pulse: Extremities well perfused. 2+ peripheral pulses. Skin: Warm. No ulcerations.       Constitutional: The physical examination finds the patient to be well-developed and well-nourished. The patient is alert and oriented x3 and was cooperative throughout the visit. Hip exam    Hip Examination: RIGHT/LEFT    Skin/Inspection: no skin lesions, cellulitis, or extreme edema in the lower extremities. Supine Exam: Non tender around the ASIS, AIIS  Flexion arc 0 to 100deg, with mild pain  Internal Rotation 15deg   External Rotation 30deg  Special Tests: +Deep Flexion Test, ++FADIR , -veFABER, -ve resisted sit-up (Athletic Pubalgia). Distal Neurovascular exam is intact (foot sensation, pulses, and motor exam)    Diagnostics:  Radiology:       No new imaging. Assessment: Patient is a 39 y.o. male with right hip pain consistent with x-ray findings of severe cam/pincer APOLINAR. I would stratify this patient as high risk for impingement related cartilage and labral injury based on his x-ray findings. He is not having relief with early physical therapy and anti-inflammatory treatment. Impression:  Visit Diagnoses       Codes    Femoral acetabular impingement    -  Primary M25.859    Tear of right acetabular labrum, initial encounter     C47.551M          Office Procedures:  No orders of the defined types were placed in this encounter. Orders Placed This Encounter   Procedures    MRI HIP RIGHT [address] [place] [address]    PHONE: [phone]  FAX: [phone]    MRI RIGHT HIP W/O CONTRAST   R/O LABRAL TEAR  MRI'S OF HIPS TO BE READ BY DR. LOGAN HAMMONDS, PER DR. Johnnie Waterman    MRI SCHEDULED FOR: NEEDS SCHEDULED    PLEASE NOTE: IMAGING RESULTS ARE NOT GIVEN OVER THE PHONE. AN IN-OFFICE APPOINTMENT IS REQUIRED UNLESS OTHER ARRANGEMENTS ARE PREVIOUSLY MADE. PLEASE SCHEDULE THIS PRIOR TO LEAVING THE OFFICE TODAY.      Standing Status:   Future     Standing Expiration Date:   1/4/2023       Plan:  Patient is a candidate for right hip MRI to rule out a full-thickness displaced labral tear. I would like to see him back after the MRI to review the findings and outline the best treatment strategy. Meanwhile he can continue physical therapy at our [address] [place] [address] office. All the patient's questions were answered while in the clinic. The patient is understanding of all instructions and agrees with the plan. Approximately 30 minutes was spent on patient education and coordinating care. Follow up in: No follow-ups on file. Sincerely,    Yael Scott MD [id number] [phone]  Email: [email or web address]  Office: [phone]    01/04/22  3:10 PM        The encounter with Flor Christiansen was carried out by myself, Dr Pedro Gtz, who personally examined the patient and reviewed the plan. This dictation was performed with a verbal recognition program (DRAGON) and it was checked for errors. It is possible that there are still dictated errors within this office note. If so, please bring any errors to my attention for an addendum. All efforts were made to ensure that this office note is accurate.

## 2022-01-11 ENCOUNTER — TELEPHONE (OUTPATIENT)
Dept: ORTHOPEDIC SURGERY | Age: 42
End: 2022-01-11

## 2022-01-11 NOTE — TELEPHONE ENCOUNTER
MRI Right Hip is approved and authorization is valid until 03/06/2022. Patient has been scheduled for MRI Right Hip at [facility] on 1/18/2022. MRI Right Hip results will not be given over the phone or via [employer] [facility]. Patient was also asked to allow 24-48 hours for follow up appointment from MRI scan in order for staff to obtain MRI report. Patient currently has a follow up appointment schedule for NEEDS FOLLOW UP. The patient was advised to contact the office to schedule this appointment to accommodate MRI scan.

## 2022-02-01 ENCOUNTER — VIRTUAL VISIT (OUTPATIENT)
Dept: ORTHOPEDIC SURGERY | Age: 42
End: 2022-02-01
Payer: COMMERCIAL

## 2022-02-01 ENCOUNTER — TELEPHONE (OUTPATIENT)
Dept: ORTHOPEDIC SURGERY | Age: 42
End: 2022-02-01

## 2022-02-01 DIAGNOSIS — M25.859 FEMORAL ACETABULAR IMPINGEMENT: Primary | ICD-10-CM

## 2022-02-01 PROCEDURE — 99214 OFFICE O/P EST MOD 30 MIN: CPT | Performed by: ORTHOPAEDIC SURGERY

## 2022-02-01 NOTE — TELEPHONE ENCOUNTER
General Question     Subject:PT IS SCHEDULED AT 8:45 THIS MORNING AND WANTS TO KNOW IF HE CAN HAVE A TELEVISIT?   [facility]  Contact Number: [phone]

## 2022-02-01 NOTE — PROGRESS NOTES
Daniel Aaron (:  1980) is a Established patient, here for evaluation of the following:    Assessment & Plan   Below is the assessment and plan developed based on review of pertinent history, physical exam, labs, studies, and medications. 1. Femoral acetabular impingement    No follow-ups on file.  PRN  At this time, Mr Jorgito Noel symptoms are improving/stabilizing  Radiographically he remains moderate to high risk for APOLINAR progression and possible cartilage damage  Future options include therapeutic cortisone vs PRP hip injection  Patient will continue with PT  To call my office back for any worsening symptoms       Subjective   HPI  Review of Systems     Moon's symptoms are improving with time and PT  PT with Sudhir Mason at our [facility] office  No NSAIDs/Medicine  Less mechanical symptoms with hip flexion  Only provocative maneuver is stretching in deep flexion      Objective   Patient-Reported Vitals  No data recorded     Physical Exam  [INSTRUCTIONS:  \"[x]\" Indicates a positive item  \"[]\" Indicates a negative item  -- DELETE ALL ITEMS NOT EXAMINED]    Constitutional: [x] Appears well-developed and well-nourished [x] No apparent distress      [] Abnormal -     Mental status: [x] Alert and awake  [x] Oriented to person/place/time [x] Able to follow commands    [] Abnormal -     Eyes:   EOM    [x]  Normal    [] Abnormal -   Sclera  [x]  Normal    [] Abnormal -          Discharge [x]  None visible   [] Abnormal -     HENT: [x] Normocephalic, atraumatic  [] Abnormal -   [x] Mouth/Throat: Mucous membranes are moist    External Ears [x] Normal  [] Abnormal -    Neck: [x] No visualized mass [] Abnormal -     Pulmonary/Chest: [x] Respiratory effort normal   [x] No visualized signs of difficulty breathing or respiratory distress        [] Abnormal -      Musculoskeletal:   [x] Normal gait with no signs of ataxia         [x] Normal range of motion of neck        [] Abnormal -     Neurological:        [x] No Facial

## 2022-02-04 ENCOUNTER — VIRTUAL VISIT (OUTPATIENT)
Dept: ENDOCRINOLOGY | Age: 42
End: 2022-02-04
Payer: COMMERCIAL

## 2022-02-04 DIAGNOSIS — E23.7 PITUITARY LESION (HCC): ICD-10-CM

## 2022-02-04 DIAGNOSIS — E29.1 HYPOGONADISM IN MALE: ICD-10-CM

## 2022-02-04 DIAGNOSIS — E22.1 HYPERPROLACTINEMIA (HCC): Primary | ICD-10-CM

## 2022-02-04 PROCEDURE — 99214 OFFICE O/P EST MOD 30 MIN: CPT | Performed by: INTERNAL MEDICINE

## 2022-02-04 PROCEDURE — G8427 DOCREV CUR MEDS BY ELIG CLIN: HCPCS | Performed by: INTERNAL MEDICINE

## 2022-02-04 RX ORDER — CABERGOLINE 0.5 MG/1
0.5 TABLET ORAL
Qty: 8 TABLET | Refills: 9 | Status: SHIPPED | OUTPATIENT
Start: 2022-02-07 | End: 2023-02-07

## 2022-02-04 NOTE — PROGRESS NOTES
Patient ID:   Laquita Williamson is a 39 y.o. male    Chief Complaint:   Laquita Williamson is seen for an evaluation of elevated prolactin levels. Referred by Dr. Fabiola Garcia MD - Neurosurgery , [facility]     The patient (or guardian if applicable) is aware that this is a billable service, which includes applicable co-pays. This Virtual Visit was conducted with patient's (and/or legal guardian's) consent. The visit was conducted pursuant to the emergency declaration under the [facility], [address] [place] [address] authority and the [employer] Act. Patient identification was verified, and a caregiver was present when appropriate. Because this was a Telehealth visit, evaluation of the following organ systems was limited: Vitals, Constitutional, EENT, Resp, CV, GI, , MS, Neuro, Skin. Heme. Lymph, Imm. Laquita Williamson was at home. Provider was at home. The patient has also been advised to contact this office for worsening conditions or problems, and seek emergency medical treatment and/or call 911 if deemed necessary. Subjective:     Weight loss clinic suggested Testosterone check which was low.  to MRI brain in July 2020 showed 7 x 9x 4 mm in the poast pituitary gland.  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   Right cerebellar lesion of 1.9 x 1.7 cms    Cabergoline 0.25 mg twice a week     Energy levels are perfect    Appetite is good   Libido is very good    Performance is good    He has morning erections     He has children   Plans to have more kids     Denies nipple discharge or breast tenderness     Not on antipsychotics, SSRIs, estrogen supplements, antiemetics, Ca channel blockers, methyldopa, opioids  Denies drugs (heroine)     No family h/o endocrine tumors     The following portions of the patient's history were reviewed and updated as appropriate:        Family History   Problem Relation Age of Onset    Brain Cancer Mother     Cancer Mother     Seizures Mother     Heart Attack Father          Social History     Socioeconomic History    Marital status:      Spouse name: Not on file    Number of children: Not on file    Years of education: Not on file    Highest education level: Not on file   Occupational History    Not on file   Tobacco Use    Smoking status: Former Smoker     Types: Cigarettes     Quit date: 2021     Years since quittin.8    Smokeless tobacco: Never Used   Vaping Use    Vaping Use: Never used   Substance and Sexual Activity    Alcohol use: Not Currently    Drug use: Never    Sexual activity: Yes     Partners: Female   Other Topics Concern    Not on file   Social History Narrative    Not on file     Social Determinants of Health     Financial Resource Strain:     Difficulty of Paying Living Expenses: Not on file   Food Insecurity:     Worried About [address] in the Last Year: Not on file    [address] [place] in the Last Year: Not on file   Transportation Needs:     Lack of Transportation (Medical): Not on file    Lack of Transportation (Non-Medical):  Not on file   Physical Activity:     Days of Exercise per Week: Not on file    Minutes of Exercise per Session: Not on file   Stress:     Feeling of Stress : Not on file   Social Connections:     Frequency of Communication with Friends and Family: Not on file    Frequency of Social Gatherings with Friends and Family: Not on file    Attends Gnosticism Services: Not on file    Active Member of Clubs or Organizations: Not on file    Attends Club or Organization Meetings: Not on file    Marital Status: Not on file   Intimate Partner Violence:     Fear of Current or Ex-Partner: Not on file    Emotionally Abused: Not on file    Physically Abused: Not on file    Sexually Abused: Not on file   Housing Stability:     Unable to Pay for Housing in the Last Year: Not on file    Number of [place] in the Last Normocephalic and atraumatic. Eyes: Conjunctivae and EOM are normal.    Neck: Normal range of motion. Thyroid normal.   Cardiovascular: Normal rate, regular rhythm and normal heart sounds. Pulmonary/Chest: Effort normal and breath sounds normal.   Abdominal: Soft. Bowel sounds are normal.   Musculoskeletal: Normal range of motion. Neurological: Patient is alert and oriented to person, place, and time. Patient has slow reflexes. Skin: Skin is warm and dry. Psoriasis of fingers and knees. Psychiatric: Patient has a normal mood and affect. Patient behavior is normal.     Lab Review:    Orders Only on 08/03/2021   Component Date Value Ref Range Status    Testosterone 08/03/2021 485  220 - 1,000 ng/dL Final    Sex Hormone Binding 08/03/2021 32  11 - 80 nmol/L Final    Testosterone, Free 08/03/2021 103.2  47 - 244 pg/mL Final    Prolactin 08/03/2021 1.5  ng/mL Final    Sodium 08/03/2021 139  136 - 145 mmol/L Final    Potassium 08/03/2021 4.5  3.5 - 5.1 mmol/L Final    Chloride 08/03/2021 103  99 - 110 mmol/L Final    CO2 08/03/2021 27  21 - 32 mmol/L Final    Anion Gap 08/03/2021 9  3 - 16 Final    Glucose 08/03/2021 85  70 - 99 mg/dL Final    BUN 08/03/2021 20  7 - 20 mg/dL Final    CREATININE 08/03/2021 0.8* 0.9 - 1.3 mg/dL Final    GFR Non- 08/03/2021 >60  >60 Final    GFR  08/03/2021 >60  >60 Final    Calcium 08/03/2021 9.2  8.3 - 10.6 mg/dL Final    Total Protein 08/03/2021 7.0  6.4 - 8.2 g/dL Final    Albumin 08/03/2021 4.7  3.4 - 5.0 g/dL Final    Albumin/Globulin Ratio 08/03/2021 2.0  1.1 - 2.2 Final    Total Bilirubin 08/03/2021 0.8  0.0 - 1.0 mg/dL Final    Alkaline Phosphatase 08/03/2021 74  40 - 129 U/L Final    ALT 08/03/2021 25  10 - 40 U/L Final    AST 08/03/2021 19  15 - 37 U/L Final    Globulin 08/03/2021 2.3  g/dL Final           Assessment and Plan     There are no diagnoses linked to this encounter.     1 Pituitary lesion with hypogonadism and hyperprolactinemia   Microadenoma vs Rathke's cleft cyst   Reviewed blood work from [facility] on his cell phone , July 2020   TSH 0.54, FSH 2.1, prolactin 63.7 (H), Testosterone 49 (L)     Secondary work was normal in Sep 2020 (including TFT's)     Prolactin was 102 before starting cabergoline in Sep 2020   Prolactin improved to 2.9 in Nov 2020   Prolactin 2.0,   - Feb 2021   Prolactin 1.5, , Free T 103 - Aug 2021     On Cabergoline 0.25 mg twice a week-     Meanwhile continue Cabergoline 0.25 mg twice a week     MRI Jan 2021: Report reviewed from [facility]. \"Pituitary: Again noted is the heterogeneous lesion in the inferior aspect of the pituitary gland. This is less well defined compared to the prior. There has been no interval increase in size. Superior pituitary tissue remains convex upward near the midline. There is no deviation of the pituitary stalk. Optic chiasm is normal.  Stable ill-defined area of high T2 and FLAIR signal in the right cerebellar hemisphere with a small amount of associated low T1 signal and dark gradient echo signal.\"     Cerebellar biopsy benign in April 2021 . Follows with Neurosurgeon. (for pituitary and cerebellar)     MRI Aug 2021: Sella: Small lesion which is hypointense on postcontrast imaging measuring about 5.9 mm and inferiorly located. He is doing MRI tomorrow at [facility]. He will have another MRI in March 2022    3: Morbid obesity   Follows with dietitian   Stays away from carbs/sugars     Discussed weight loss options of exercise (150 minutes per week) plus diet plans   Diet plans may include intermittent fasting, low carb diet, weight watchers, mediterranean diet or caloric restriction.    Work on comorbid conditions and improved sleep     3: Psoarisis of fingers/knees   Improving       Prolactin, CMP , will mail     RTC in 9 months     Electronically signed by Georgie Peabody, MD on 2/4/2022 at 7:53 AM

## 2022-02-15 LAB
ALBUMIN/GLOBULIN RATIO: 2.1 RATIO (ref 0.8–2.6)
ALBUMIN: 4.9 G/DL (ref 3.5–5.2)
ALP BLD-CCNC: 73 U/L (ref 23–144)
ALT SERPL-CCNC: 27 U/L (ref 0–60)
AST SERPL-CCNC: 17 U/L (ref 0–55)
BILIRUB SERPL-MCNC: 0.9 MG/DL (ref 0–1.2)
BUN BLDV-MCNC: 19 MG/DL (ref 3–29)
BUN/CREAT BLD: 24 (ref 7–25)
CALCIUM SERPL-MCNC: 9.7 MG/DL (ref 8.5–10.5)
CHLORIDE BLD-SCNC: 103 MEQ/L (ref 96–110)
CO2: 27 MEQ/L (ref 19–32)
CREAT SERPL-MCNC: 0.8 MG/DL (ref 0.5–1.4)
GLOBULIN: 2.3 G/DL (ref 1.9–3.6)
GLOMERULAR FILTRATION RATE: 111 MLS/MIN/1.73M2
GLUCOSE BLD-MCNC: 104 MG/DL (ref 70–99)
POTASSIUM SERPL-SCNC: 4.3 MEQ/L (ref 3.4–5.3)
PROLACTIN: 1 NG/ML (ref 2–18)
SODIUM BLD-SCNC: 142 MEQ/L (ref 135–148)
STATUS: ABNORMAL
TOTAL PROTEIN: 7.2 G/DL (ref 6–8.3)

## 2022-11-09 PROBLEM — F17.200 TOBACCO DEPENDENCE: Status: ACTIVE | Noted: 2021-04-13

## 2022-11-09 PROBLEM — G89.18 POSTOPERATIVE PAIN: Status: ACTIVE | Noted: 2022-11-09

## 2022-11-09 PROBLEM — G93.89 BRAIN MASS: Status: ACTIVE | Noted: 2021-04-26

## 2022-11-09 PROBLEM — E23.6 PITUITARY MASS (HCC): Status: ACTIVE | Noted: 2021-04-13

## 2022-11-10 ENCOUNTER — OFFICE VISIT (OUTPATIENT)
Dept: ENDOCRINOLOGY | Age: 42
End: 2022-11-10
Payer: COMMERCIAL

## 2022-11-10 VITALS
DIASTOLIC BLOOD PRESSURE: 79 MMHG | HEART RATE: 80 BPM | BODY MASS INDEX: 33.93 KG/M2 | OXYGEN SATURATION: 98 % | WEIGHT: 216.2 LBS | HEIGHT: 67 IN | SYSTOLIC BLOOD PRESSURE: 121 MMHG

## 2022-11-10 DIAGNOSIS — E29.1 HYPOGONADISM IN MALE: ICD-10-CM

## 2022-11-10 DIAGNOSIS — E22.1 HYPERPROLACTINEMIA (HCC): Primary | ICD-10-CM

## 2022-11-10 DIAGNOSIS — E23.7 PITUITARY LESION (HCC): ICD-10-CM

## 2022-11-10 DIAGNOSIS — E22.1 HYPERPROLACTINEMIA (HCC): ICD-10-CM

## 2022-11-10 LAB
A/G RATIO: 1.9 (ref 1.1–2.2)
ALBUMIN SERPL-MCNC: 4.9 G/DL (ref 3.4–5)
ALP BLD-CCNC: 64 U/L (ref 40–129)
ALT SERPL-CCNC: 25 U/L (ref 10–40)
ANION GAP SERPL CALCULATED.3IONS-SCNC: 13 MMOL/L (ref 3–16)
AST SERPL-CCNC: 17 U/L (ref 15–37)
BILIRUB SERPL-MCNC: 0.8 MG/DL (ref 0–1)
BUN BLDV-MCNC: 16 MG/DL (ref 7–20)
CALCIUM SERPL-MCNC: 9.5 MG/DL (ref 8.3–10.6)
CHLORIDE BLD-SCNC: 102 MMOL/L (ref 99–110)
CO2: 26 MMOL/L (ref 21–32)
CREAT SERPL-MCNC: 0.9 MG/DL (ref 0.9–1.3)
GFR SERPL CREATININE-BSD FRML MDRD: >60 ML/MIN/{1.73_M2}
GLUCOSE BLD-MCNC: 86 MG/DL (ref 70–99)
POTASSIUM SERPL-SCNC: 4.5 MMOL/L (ref 3.5–5.1)
PROLACTIN: 1.8 NG/ML
SODIUM BLD-SCNC: 141 MMOL/L (ref 136–145)
T3 FREE: 3.9 PG/ML (ref 2.3–4.2)
T4 FREE: 1.4 NG/DL (ref 0.9–1.8)
TOTAL PROTEIN: 7.5 G/DL (ref 6.4–8.2)
TSH SERPL DL<=0.05 MIU/L-ACNC: 0.74 UIU/ML (ref 0.27–4.2)

## 2022-11-10 PROCEDURE — 99214 OFFICE O/P EST MOD 30 MIN: CPT | Performed by: INTERNAL MEDICINE

## 2022-11-10 PROCEDURE — 1036F TOBACCO NON-USER: CPT | Performed by: INTERNAL MEDICINE

## 2022-11-10 PROCEDURE — G8484 FLU IMMUNIZE NO ADMIN: HCPCS | Performed by: INTERNAL MEDICINE

## 2022-11-10 PROCEDURE — G8417 CALC BMI ABV UP PARAM F/U: HCPCS | Performed by: INTERNAL MEDICINE

## 2022-11-10 PROCEDURE — G8427 DOCREV CUR MEDS BY ELIG CLIN: HCPCS | Performed by: INTERNAL MEDICINE

## 2022-11-10 NOTE — PROGRESS NOTES
Patient ID:   Jorge Lopez is a 43 y.o. male    Chief Complaint:   Jorge Lopez is seen for an evaluation of elevated prolactin levels. Referred by Dr. Giuliano Junior MD - Neurosurgery , [facility]     Subjective:   Weight loss clinic suggested Testosterone check which was low.  to MRI brain in 2020 showed 7 x 9x 4 mm in the poast pituitary gland.  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   Right cerebellar lesion of 1.9 x 1.7 cms  Not on antipsychotics, SSRIs, estrogen supplements, antiemetics, Ca channel blockers, methyldopa, opioids  Denies drugs (heroine)   No family h/o endocrine tumors     Currently on   Cabergoline 0.25 mg twice a week Wednesday and     Energy levels are perfect, a little tired for 1-2 months     Appetite is good   Libido is very good    Performance is good    He has morning erections     He has children   Had a baby boy in May 2022      Denies nipple discharge or breast tenderness       The following portions of the patient's history were reviewed and updated as appropriate:          Family History   Problem Relation Age of Onset    Brain Cancer Mother     Cancer Mother     Seizures Mother     Heart Attack Father            Social History     Socioeconomic History    Marital status:      Spouse name: Not on file    Number of children: Not on file    Years of education: Not on file    Highest education level: Not on file   Occupational History    Not on file   Tobacco Use    Smoking status: Former     Types: Cigarettes     Quit date: 2021     Years since quittin.6    Smokeless tobacco: Never   Vaping Use    Vaping Use: Never used   Substance and Sexual Activity    Alcohol use: Not Currently    Drug use: Never    Sexual activity: Yes     Partners: Female   Other Topics Concern    Not on file   Social History Narrative    Not on file     Social Determinants of Health     Financial Resource Strain: Not on file   Food Insecurity: Not on file   Transportation Needs: Not on file   Physical Activity: Not on file   Stress: Not on file   Social Connections: Not on file   Intimate Partner Violence: Not on file   Housing Stability: Not on file           Past Medical History:   Diagnosis Date    Cerebellar lesion     GERD (gastroesophageal reflux disease)     Pituitary lesion ([facility] [address]     Pituitary lesion ([facility]            Past Surgical History:   Procedure Laterality Date    ABDOMEN SURGERY      OTHER SURGICAL HISTORY      biopsy back of the head       No Known Allergies      Current Outpatient Medications:     cabergoline (DOSTINEX) 0.5 MG tablet, Take 1 tablet by mouth Twice a Week, Disp: 8 tablet, Rfl: 9    omeprazole (PRILOSEC) 20 MG delayed release capsule, Take 40 mg by mouth daily , Disp: , Rfl:       Review of Systems:  Constitutional: Negative for fever, chills, and unexpected weight change. HENT: Negative for congestion, ear pain, rhinorrhea,  sore throat and trouble swallowing. Eyes: Negative for photophobia, redness, itching. Respiratory: Negative for cough, shortness of breath and sputum. Cardiovascular: Negative for chest pain, palpitations and leg swelling. Gastrointestinal: Negative for nausea, vomiting, abdominal pain, diarrhea, constipation. Endocrine: Negative for cold intolerance, heat intolerance, polydipsia, polyphagia and polyuria. Genitourinary: Negative for dysuria, urgency, frequency, hematuria and flank pain. Musculoskeletal: Negative for myalgias, back pain, arthralgias and neck pain. Skin/Nail: Negative for rash, itching. Normal nails. Neurological: Negative for seizures, weakness, light-headedness, numbness and headaches. Hematological/ Lymph nodes: Negative for adenopathy. Does not bruise/bleed easily. Psychiatric/Behavioral: Negative for suicidal ideas, depression, anxiety, sleep disturbance and decreased concentration.           Objective:   Physical Exam:  /79 (Site: Right Upper Arm, Position: Sitting, Cuff Size: Large Adult)   Pulse 80   Ht 5' 7\" (1.702 m)   Wt 216 lb 3.2 oz (98.1 kg)   SpO2 98%   BMI 33.86 kg/m²   Constitutional: Patient is oriented to person, place, and time. Patient appears well-developed and well-nourished. HENT:    Head: Normocephalic and atraumatic. Eyes: Conjunctivae and EOM are normal.    Neck: Normal range of motion. Cardiovascular: Normal rate, regular rhythm and normal heart sounds. Pulmonary/Chest: Effort normal and breath sounds normal.   Musculoskeletal: Normal range of motion. Neurological: Patient is alert and oriented to person, place, and time. Skin: Skin is warm and dry. Psoriasis of fingers and knees. Psychiatric: Patient has a normal mood and affect. Patient behavior is normal.     Lab Review:    Hospital Outpatient Visit on 01/04/2022   Component Date Value Ref Range Status    Sodium 02/15/2022 142  135 - 148 MEQ/L Final    Potassium 02/15/2022 4.3  3.4 - 5.3 MEQ/L Final    Chloride 02/15/2022 103  96 - 110 MEQ/L Final    CO2 02/15/2022 27  19 - 32 MEQ/L Final    Glucose 02/15/2022 104 (A)  70 - 99 MG/DL Final    BUN 02/15/2022 19  3 - 29 MG/DL Final    Creatinine 02/15/2022 0.8  0.5 - 1.4 MG/DL Final    Bun/Cre Ratio 02/15/2022 24  7 - 25 Final    Glomerular Filtration Rate 02/15/2022 111  >60 MLS/MIN/1.73M2 Final    Calcium 02/15/2022 9.7  8.5 - 10.5 MG/DL Final    Total Protein 02/15/2022 7.2  6.0 - 8.3 G/DL Final    Albumin 02/15/2022 4.9  3.5 - 5.2 G/DL Final    Globulin 02/15/2022 2.3  1.9 - 3.6 G/DL Final    Albumin/Globulin Ratio 02/15/2022 2.1  0.8 - 2.6 RATIO Final    Total Bilirubin 02/15/2022 0.9  0.0 - 1.2 MG/DL Final    AST 02/15/2022 17  0 - 55 U/L Final    ALT 02/15/2022 27  0 - 60 U/L Final    Alkaline Phosphatase 02/15/2022 73  23 - 144 U/L Final    Status 02/15/2022 FASTING   Final    Prolactin 02/15/2022 1 (A)  2 - 18 NG/ML Final           Assessment and Plan     Amy Jiang was seen today for follow-up.     Diagnoses and all orders for this visit:    Hyperprolactinemia ([facility] [address]  -     Comprehensive Metabolic Panel; Future  -     TSH; Future  -     T4, Free; Future  -     T3, Free; Future  -     Prolactin; Future    Pituitary lesion ([facility] [address]  -     Comprehensive Metabolic Panel; Future  -     TSH; Future  -     T4, Free; Future  -     T3, Free; Future  -     Prolactin; Future    Hypogonadism in male  -     Comprehensive Metabolic Panel; Future  -     TSH; Future  -     T4, Free; Future  -     T3, Free; Future  -     Prolactin; Future      1 Pituitary lesion with hypogonadism and hyperprolactinemia   Microadenoma vs Rathke's cleft cyst   Reviewed blood work from [facility] on his cell phone , July 2020   TSH 0.54, FSH 2.1, prolactin 63.7 (H), Testosterone 49 (L)     Secondary work was normal in Sep 2020 (including TFT's)     Prolactin was 102 before starting cabergoline in Sep 2020   Prolactin improved to 2.9 in Nov 2020   Prolactin 2.0,   - Feb 2021   Prolactin 1.5, , Free T 103 - Aug 2021     On Cabergoline 0.25 mg twice a week-     Meanwhile continue Cabergoline 0.25 mg twice a week     MRI Jan 2021: Report reviewed from [facility]. \"Pituitary: Again noted is the heterogeneous lesion in the inferior aspect of the pituitary gland. This is less well defined compared to the prior. There has been no interval increase in size. Superior pituitary tissue remains convex upward near the midline. There is no deviation of the pituitary stalk. Optic chiasm is normal.  Stable ill-defined area of high T2 and FLAIR signal in the right cerebellar hemisphere with a small amount of associated low T1 signal and dark gradient echo signal.\"     Cerebellar biopsy benign in April 2021 . Follows with Neurosurgeon. (for pituitary and cerebellar)     MRI Aug 2021: [place]: Small lesion which is hypointense on postcontrast imaging measuring about 5.9 mm and inferiorly located. MRI pituitary gland: Ag 2022:  The 7 mm lesion in the pituitary gland unchanged compared to prior exam.. He is doing MRI tomorrow at [facility]. He will have another MRI in Aug 2023    3: Morbid obesity   Follows with dietitian   Stays away from carbs/sugars     Discussed weight loss options of exercise (150 minutes per week) plus diet plans   Diet plans may include intermittent fasting, low carb diet, weight watchers, mediterranean diet or caloric restriction. Work on comorbid conditions and improved sleep     3: Psoarisis of fingers/knees   Improving     4: Took T replacement in July 2022   Got T pellets in July 2022.  They were suppose to last for 3 months   Energy levels are likely low due to T withdrawal   We should wait 6 months - April 2023 to recheck levels and consider clomid      Prolactin, CMP, TFTs today      RTC in 6 months   Will check TT next time     Electronically signed by William Nolen MD on 11/10/2022 at 12:16 PM

## 2022-11-11 DIAGNOSIS — E23.7 PITUITARY LESION (HCC): ICD-10-CM

## 2022-11-11 DIAGNOSIS — E22.1 HYPERPROLACTINEMIA (HCC): ICD-10-CM

## 2022-11-11 DIAGNOSIS — E29.1 HYPOGONADISM IN MALE: ICD-10-CM

## 2022-11-11 RX ORDER — CABERGOLINE 0.5 MG/1
0.5 TABLET ORAL
Qty: 8 TABLET | Refills: 9 | Status: SHIPPED | OUTPATIENT
Start: 2022-11-14 | End: 2023-11-14

## 2023-05-08 PROBLEM — S62.639A CLOSED FRACTURE OF DISTAL PHALANX OF FINGER: Status: ACTIVE | Noted: 2023-05-08

## 2023-05-10 ENCOUNTER — OFFICE VISIT (OUTPATIENT)
Dept: ENDOCRINOLOGY | Age: 43
End: 2023-05-10
Payer: COMMERCIAL

## 2023-05-10 VITALS
WEIGHT: 194.8 LBS | HEART RATE: 69 BPM | DIASTOLIC BLOOD PRESSURE: 66 MMHG | BODY MASS INDEX: 30.57 KG/M2 | OXYGEN SATURATION: 98 % | SYSTOLIC BLOOD PRESSURE: 109 MMHG | HEIGHT: 67 IN

## 2023-05-10 DIAGNOSIS — E29.1 HYPOGONADISM IN MALE: ICD-10-CM

## 2023-05-10 DIAGNOSIS — E23.7 PITUITARY LESION (HCC): ICD-10-CM

## 2023-05-10 DIAGNOSIS — E22.1 HYPERPROLACTINEMIA (HCC): Primary | ICD-10-CM

## 2023-05-10 PROCEDURE — G8427 DOCREV CUR MEDS BY ELIG CLIN: HCPCS | Performed by: INTERNAL MEDICINE

## 2023-05-10 PROCEDURE — 99214 OFFICE O/P EST MOD 30 MIN: CPT | Performed by: INTERNAL MEDICINE

## 2023-05-10 PROCEDURE — 1036F TOBACCO NON-USER: CPT | Performed by: INTERNAL MEDICINE

## 2023-05-10 PROCEDURE — G8417 CALC BMI ABV UP PARAM F/U: HCPCS | Performed by: INTERNAL MEDICINE

## 2023-05-10 RX ORDER — CABERGOLINE 0.5 MG/1
0.5 TABLET ORAL
Qty: 8 TABLET | Refills: 11 | Status: SHIPPED | OUTPATIENT
Start: 2023-05-11 | End: 2024-05-10

## 2023-05-10 RX ORDER — METHYLPHENIDATE HYDROCHLORIDE 36 MG/1
36 TABLET, EXTENDED RELEASE ORAL DAILY
COMMUNITY
Start: 2023-05-01

## 2023-05-10 NOTE — PROGRESS NOTES
of the pituitary stalk. Optic chiasm is normal.  Stable ill-defined area of high T2 and FLAIR signal in the right cerebellar hemisphere with a small amount of associated low T1 signal and dark gradient echo signal.\"     Cerebellar biopsy benign in April 2021 . Follows with Neurosurgeon. (for pituitary and cerebellar)     MRI Aug 2021: [place]: Small lesion which is hypointense on postcontrast imaging measuring about 5.9 mm and inferiorly located. MRI pituitary gland: Aug 2022: The 7 mm lesion in the pituitary gland unchanged compared to prior exam..     Will do next MRI in Aug 2023       3: Morbid obesity   Follows with dietitian   Stays away from carbs/sugars     Discussed weight loss options of exercise (150 minutes per week) plus diet plans   Diet plans may include intermittent fasting, low carb diet, weight watchers, mediterranean diet or caloric restriction. Work on comorbid conditions and improved sleep     3: Psoarisis of fingers/knees   Resolved with weight loss     4: Took T replacement in July 2022   Got T pellets in July 2022.  They were suppose to last for 3 months   Energy levels are likely low due to T withdrawal     recheck levels and consider clomid      Prolactin, CMP, TT  today      RTC in 6-9 months      Electronically signed by Maricarmen Nixon MD on 5/10/2023 at 11:01 AM

## 2023-05-11 LAB
ALBUMIN/GLOBULIN RATIO: 2 RATIO (ref 0.8–2.6)
ALBUMIN: 4.9 G/DL (ref 3.5–5.2)
ALP BLD-CCNC: 65 U/L (ref 23–144)
ALT SERPL-CCNC: 29 U/L (ref 0–60)
AST SERPL-CCNC: 25 U/L (ref 0–55)
BILIRUB SERPL-MCNC: 1.2 MG/DL (ref 0–1.2)
BUN BLDV-MCNC: 22 MG/DL (ref 3–29)
BUN/CREAT BLD: 24 (ref 7–25)
CALCIUM SERPL-MCNC: 9.7 MG/DL (ref 8.5–10.5)
CHLORIDE BLD-SCNC: 104 MEQ/L (ref 96–110)
CO2: 26 MEQ/L (ref 19–32)
CREAT SERPL-MCNC: 0.9 MG/DL (ref 0.5–1.4)
GLOBULIN: 2.4 G/DL (ref 1.9–3.6)
GLOMERULAR FILTRATION RATE: 109 MLS/MIN/1.73M2
GLUCOSE BLD-MCNC: 94 MG/DL (ref 70–99)
POTASSIUM SERPL-SCNC: 4.3 MEQ/L (ref 3.4–5.3)
PROLACTIN: 1 NG/ML (ref 2–18)
SODIUM BLD-SCNC: 142 MEQ/L (ref 135–148)
STATUS: NORMAL
TOTAL PROTEIN: 7.3 G/DL (ref 6–8.3)

## 2023-05-18 LAB
TESTOSTERONE FREE: 76.6 PG/ML (ref 35–155)
TESTOSTERONE TOTAL: 476 NG/DL (ref 250–1100)

## 2023-11-10 NOTE — PROGRESS NOTES
ALT 05/11/2023 29  0 - 60 U/L Final    Alkaline Phosphatase 05/11/2023 65  23 - 144 U/L Final    Status 05/11/2023 FASTING   Final    Prolactin 05/11/2023 1 (L)  2 - 18 NG/ML Final    Testosterone 05/11/2023 476  250 - 1100 ng/dL Final    Testosterone, Free 05/11/2023 76.6  35.0 - 155.0 pg/mL Final           Assessment and [address] was seen today for follow-up. Diagnoses and all orders for this visit:    Hyperprolactinemia ([address])  -     Comprehensive Metabolic Panel; Future  -     CBC with Auto Differential; Future  -     TSH; Future  -     T4, Free; Future  -     T3, Free; Future  -     Prolactin; Future  -     Estrogens, Fractionated; Future  -     Testosterone, free, total; Future    Pituitary lesion ([address])  -     Comprehensive Metabolic Panel; Future  -     CBC with Auto Differential; Future  -     TSH; Future  -     T4, Free; Future  -     T3, Free; Future  -     Prolactin; Future  -     Estrogens, Fractionated; Future  -     Testosterone, free, total; Future    Hypogonadism in male  -     Comprehensive Metabolic Panel; Future  -     CBC with Auto Differential; Future  -     TSH; Future  -     T4, Free; Future  -     T3, Free; Future  -     Prolactin; Future  -     Estrogens, Fractionated; Future  -     Testosterone, free, total; Future            1 Pituitary lesion with hypogonadism and hyperprolactinemia   Microadenoma vs Rathke's cleft cyst   Reviewed blood work from [facility] on his cell phone , July 2020   TSH 0.54, FSH 2.1, prolactin 63.7 (H), Testosterone 49 (L)     Secondary work was normal in Sep 2020 (including TFT's)   TFTs normal - Nov 2022     Prolactin was 102 before starting cabergoline in Sep 2020   Prolactin improved to 2.9 in Nov 2020   Prolactin 2.0,   - Feb 2021   Prolactin 1.5, , Free T 103 - Aug 2021   Prolactin 1 < 1.8 < 1        Meanwhile continue Cabergoline 0.25 mg twice a week     MRI Jan 2021: Report reviewed from [facility].  \"Pituitary: Again noted is the

## 2023-11-14 ENCOUNTER — OFFICE VISIT (OUTPATIENT)
Dept: ENDOCRINOLOGY | Age: 43
End: 2023-11-14
Payer: COMMERCIAL

## 2023-11-14 VITALS
BODY MASS INDEX: 30.52 KG/M2 | SYSTOLIC BLOOD PRESSURE: 125 MMHG | WEIGHT: 201.4 LBS | OXYGEN SATURATION: 97 % | HEIGHT: 68 IN | DIASTOLIC BLOOD PRESSURE: 74 MMHG | HEART RATE: 70 BPM

## 2023-11-14 DIAGNOSIS — E29.1 HYPOGONADISM IN MALE: ICD-10-CM

## 2023-11-14 DIAGNOSIS — E22.1 HYPERPROLACTINEMIA (HCC): Primary | ICD-10-CM

## 2023-11-14 DIAGNOSIS — E23.7 PITUITARY LESION (HCC): ICD-10-CM

## 2023-11-14 PROCEDURE — 99214 OFFICE O/P EST MOD 30 MIN: CPT | Performed by: INTERNAL MEDICINE

## 2023-11-14 PROCEDURE — 1036F TOBACCO NON-USER: CPT | Performed by: INTERNAL MEDICINE

## 2023-11-14 PROCEDURE — G8484 FLU IMMUNIZE NO ADMIN: HCPCS | Performed by: INTERNAL MEDICINE

## 2023-11-14 PROCEDURE — G8427 DOCREV CUR MEDS BY ELIG CLIN: HCPCS | Performed by: INTERNAL MEDICINE

## 2023-11-14 PROCEDURE — G8417 CALC BMI ABV UP PARAM F/U: HCPCS | Performed by: INTERNAL MEDICINE

## 2023-11-14 RX ORDER — CABERGOLINE 0.5 MG/1
0.5 TABLET ORAL
Qty: 8 TABLET | Refills: 11 | Status: SHIPPED | OUTPATIENT
Start: 2023-11-16 | End: 2024-11-15

## 2023-11-15 LAB
ALBUMIN/GLOBULIN RATIO: 2.4 RATIO (ref 0.8–2.6)
ALBUMIN: 4.5 G/DL (ref 3.5–5.2)
ALP BLD-CCNC: 54 U/L (ref 23–144)
ALT SERPL-CCNC: 50 U/L (ref 0–60)
AST SERPL-CCNC: 36 U/L (ref 0–55)
BILIRUB SERPL-MCNC: 0.7 MG/DL (ref 0–1.2)
BUN BLDV-MCNC: 20 MG/DL (ref 3–29)
BUN/CREAT BLD: 22 (ref 7–25)
CALCIUM SERPL-MCNC: 9 MG/DL (ref 8.5–10.5)
CHLORIDE BLD-SCNC: 106 MEQ/L (ref 96–110)
CO2: 28 MEQ/L (ref 19–32)
CREAT SERPL-MCNC: 0.9 MG/DL (ref 0.5–1.4)
GLOBULIN: 1.9 G/DL (ref 1.9–3.6)
GLOMERULAR FILTRATION RATE: 109 MLS/MIN/1.73M2
GLUCOSE BLD-MCNC: 102 MG/DL (ref 70–99)
POTASSIUM SERPL-SCNC: 4.5 MEQ/L (ref 3.4–5.3)
PROLACTIN: 1 NG/ML (ref 2–18)
SODIUM BLD-SCNC: 143 MEQ/L (ref 135–148)
STATUS: ABNORMAL
T3 FREE: 3.2 PG/ML (ref 2.3–4.2)
T4 FREE: 1.17 NG/DL (ref 0.8–1.8)
TOTAL PROTEIN: 6.4 G/DL (ref 6–8.3)
TSH SERPL DL<=0.05 MIU/L-ACNC: 0.67 MCIU/ML (ref 0.4–4.5)

## 2023-11-18 LAB
TESTOSTERONE FREE: 93.9 PG/ML (ref 35–155)
TESTOSTERONE TOTAL: 505 NG/DL (ref 250–1100)

## 2023-11-20 DIAGNOSIS — E34.8 EXCESS ESTROGEN IN MALE: Primary | ICD-10-CM

## 2023-11-20 LAB
ESTRADIOL, ULTRASENSITIVE: 32 PG/ML
ESTRIOL: <0.1 NG/ML
ESTRONE: 37 PG/ML

## 2023-11-20 RX ORDER — ANASTROZOLE 1 MG/1
0.5 TABLET ORAL WEEKLY
Qty: 7 TABLET | Refills: 3 | Status: SHIPPED | OUTPATIENT
Start: 2023-11-20